# Patient Record
Sex: FEMALE | Race: OTHER | Employment: OTHER | URBAN - METROPOLITAN AREA
[De-identification: names, ages, dates, MRNs, and addresses within clinical notes are randomized per-mention and may not be internally consistent; named-entity substitution may affect disease eponyms.]

---

## 2017-08-01 ENCOUNTER — APPOINTMENT (OUTPATIENT)
Dept: CT IMAGING | Age: 82
DRG: 065 | End: 2017-08-01
Attending: PHYSICIAN ASSISTANT
Payer: SUBSIDIZED

## 2017-08-01 ENCOUNTER — APPOINTMENT (OUTPATIENT)
Dept: GENERAL RADIOLOGY | Age: 82
DRG: 065 | End: 2017-08-01
Attending: PHYSICIAN ASSISTANT
Payer: SUBSIDIZED

## 2017-08-01 ENCOUNTER — APPOINTMENT (OUTPATIENT)
Dept: MRI IMAGING | Age: 82
DRG: 065 | End: 2017-08-01
Attending: INTERNAL MEDICINE
Payer: SUBSIDIZED

## 2017-08-01 ENCOUNTER — HOSPITAL ENCOUNTER (INPATIENT)
Age: 82
LOS: 2 days | Discharge: HOME HEALTH | DRG: 065 | End: 2017-08-03
Attending: EMERGENCY MEDICINE | Admitting: INTERNAL MEDICINE
Payer: SUBSIDIZED

## 2017-08-01 DIAGNOSIS — I63.9 OCCIPITAL STROKE (HCC): Primary | ICD-10-CM

## 2017-08-01 PROBLEM — R53.1 RIGHT SIDED WEAKNESS: Status: ACTIVE | Noted: 2017-08-01

## 2017-08-01 PROBLEM — E11.9 TYPE 2 DIABETES MELLITUS WITHOUT COMPLICATION (HCC): Status: ACTIVE | Noted: 2017-08-01

## 2017-08-01 PROBLEM — R51.9 GENERALIZED HEADACHES: Status: ACTIVE | Noted: 2017-08-01

## 2017-08-01 PROBLEM — I10 HTN (HYPERTENSION), BENIGN: Status: ACTIVE | Noted: 2017-08-01

## 2017-08-01 LAB
ALBUMIN SERPL BCP-MCNC: 3.8 G/DL (ref 3.5–5)
ALBUMIN/GLOB SERPL: 1 {RATIO} (ref 1.1–2.2)
ALP SERPL-CCNC: 72 U/L (ref 45–117)
ALT SERPL-CCNC: 19 U/L (ref 12–78)
ANION GAP BLD CALC-SCNC: 6 MMOL/L (ref 5–15)
AST SERPL W P-5'-P-CCNC: 17 U/L (ref 15–37)
ATRIAL RATE: 96 BPM
BASOPHILS # BLD AUTO: 0 K/UL (ref 0–0.1)
BASOPHILS # BLD: 0 % (ref 0–1)
BILIRUB SERPL-MCNC: 0.7 MG/DL (ref 0.2–1)
BUN SERPL-MCNC: 16 MG/DL (ref 6–20)
BUN/CREAT SERPL: 27 (ref 12–20)
CALCIUM SERPL-MCNC: 8.8 MG/DL (ref 8.5–10.1)
CALCULATED P AXIS, ECG09: 59 DEGREES
CALCULATED R AXIS, ECG10: 37 DEGREES
CALCULATED T AXIS, ECG11: 17 DEGREES
CHLORIDE SERPL-SCNC: 105 MMOL/L (ref 97–108)
CO2 SERPL-SCNC: 30 MMOL/L (ref 21–32)
CREAT SERPL-MCNC: 0.6 MG/DL (ref 0.55–1.02)
DIAGNOSIS, 93000: NORMAL
EOSINOPHIL # BLD: 0 K/UL (ref 0–0.4)
EOSINOPHIL NFR BLD: 0 % (ref 0–7)
ERYTHROCYTE [DISTWIDTH] IN BLOOD BY AUTOMATED COUNT: 12.9 % (ref 11.5–14.5)
EST. AVERAGE GLUCOSE BLD GHB EST-MCNC: 146 MG/DL
GLOBULIN SER CALC-MCNC: 3.7 G/DL (ref 2–4)
GLUCOSE BLD STRIP.AUTO-MCNC: 119 MG/DL (ref 65–100)
GLUCOSE SERPL-MCNC: 128 MG/DL (ref 65–100)
HBA1C MFR BLD: 6.7 % (ref 4.2–6.3)
HCT VFR BLD AUTO: 43.1 % (ref 35–47)
HGB BLD-MCNC: 13.9 G/DL (ref 11.5–16)
LYMPHOCYTES # BLD AUTO: 21 % (ref 12–49)
LYMPHOCYTES # BLD: 1.6 K/UL (ref 0.8–3.5)
MAGNESIUM SERPL-MCNC: 2.2 MG/DL (ref 1.6–2.4)
MCH RBC QN AUTO: 29.5 PG (ref 26–34)
MCHC RBC AUTO-ENTMCNC: 32.3 G/DL (ref 30–36.5)
MCV RBC AUTO: 91.5 FL (ref 80–99)
MONOCYTES # BLD: 0.5 K/UL (ref 0–1)
MONOCYTES NFR BLD AUTO: 6 % (ref 5–13)
NEUTS SEG # BLD: 5.5 K/UL (ref 1.8–8)
NEUTS SEG NFR BLD AUTO: 73 % (ref 32–75)
P-R INTERVAL, ECG05: 170 MS
PLATELET # BLD AUTO: 247 K/UL (ref 150–400)
POTASSIUM SERPL-SCNC: 3.9 MMOL/L (ref 3.5–5.1)
PROT SERPL-MCNC: 7.5 G/DL (ref 6.4–8.2)
Q-T INTERVAL, ECG07: 356 MS
QRS DURATION, ECG06: 94 MS
QTC CALCULATION (BEZET), ECG08: 449 MS
RBC # BLD AUTO: 4.71 M/UL (ref 3.8–5.2)
SERVICE CMNT-IMP: ABNORMAL
SODIUM SERPL-SCNC: 141 MMOL/L (ref 136–145)
T3FREE SERPL-MCNC: 2.7 PG/ML (ref 2.2–4)
T4 FREE SERPL-MCNC: 1 NG/DL (ref 0.8–1.5)
TROPONIN I SERPL-MCNC: <0.04 NG/ML
TSH SERPL DL<=0.05 MIU/L-ACNC: 1.06 UIU/ML (ref 0.36–3.74)
VENTRICULAR RATE, ECG03: 96 BPM
WBC # BLD AUTO: 7.6 K/UL (ref 3.6–11)

## 2017-08-01 PROCEDURE — 96361 HYDRATE IV INFUSION ADD-ON: CPT

## 2017-08-01 PROCEDURE — 99285 EMERGENCY DEPT VISIT HI MDM: CPT

## 2017-08-01 PROCEDURE — 74011250636 HC RX REV CODE- 250/636: Performed by: INTERNAL MEDICINE

## 2017-08-01 PROCEDURE — 84484 ASSAY OF TROPONIN QUANT: CPT | Performed by: PHYSICIAN ASSISTANT

## 2017-08-01 PROCEDURE — 82962 GLUCOSE BLOOD TEST: CPT

## 2017-08-01 PROCEDURE — 85025 COMPLETE CBC W/AUTO DIFF WBC: CPT | Performed by: PHYSICIAN ASSISTANT

## 2017-08-01 PROCEDURE — 36415 COLL VENOUS BLD VENIPUNCTURE: CPT | Performed by: PHYSICIAN ASSISTANT

## 2017-08-01 PROCEDURE — 74011250637 HC RX REV CODE- 250/637: Performed by: INTERNAL MEDICINE

## 2017-08-01 PROCEDURE — 70450 CT HEAD/BRAIN W/O DYE: CPT

## 2017-08-01 PROCEDURE — 65660000000 HC RM CCU STEPDOWN

## 2017-08-01 PROCEDURE — 80053 COMPREHEN METABOLIC PANEL: CPT | Performed by: PHYSICIAN ASSISTANT

## 2017-08-01 PROCEDURE — 84481 FREE ASSAY (FT-3): CPT | Performed by: INTERNAL MEDICINE

## 2017-08-01 PROCEDURE — 93005 ELECTROCARDIOGRAM TRACING: CPT

## 2017-08-01 PROCEDURE — 81001 URINALYSIS AUTO W/SCOPE: CPT | Performed by: PHYSICIAN ASSISTANT

## 2017-08-01 PROCEDURE — 74011250636 HC RX REV CODE- 250/636: Performed by: PHYSICIAN ASSISTANT

## 2017-08-01 PROCEDURE — 96374 THER/PROPH/DIAG INJ IV PUSH: CPT

## 2017-08-01 PROCEDURE — 93880 EXTRACRANIAL BILAT STUDY: CPT

## 2017-08-01 PROCEDURE — 71020 XR CHEST PA LAT: CPT

## 2017-08-01 PROCEDURE — 83735 ASSAY OF MAGNESIUM: CPT | Performed by: PHYSICIAN ASSISTANT

## 2017-08-01 PROCEDURE — 70551 MRI BRAIN STEM W/O DYE: CPT

## 2017-08-01 PROCEDURE — 84439 ASSAY OF FREE THYROXINE: CPT | Performed by: INTERNAL MEDICINE

## 2017-08-01 PROCEDURE — 83036 HEMOGLOBIN GLYCOSYLATED A1C: CPT | Performed by: INTERNAL MEDICINE

## 2017-08-01 PROCEDURE — 84443 ASSAY THYROID STIM HORMONE: CPT | Performed by: INTERNAL MEDICINE

## 2017-08-01 RX ORDER — ENOXAPARIN SODIUM 100 MG/ML
40 INJECTION SUBCUTANEOUS EVERY 24 HOURS
Status: DISCONTINUED | OUTPATIENT
Start: 2017-08-01 | End: 2017-08-03 | Stop reason: HOSPADM

## 2017-08-01 RX ORDER — ATORVASTATIN CALCIUM 10 MG/1
40 TABLET, FILM COATED ORAL
Status: DISCONTINUED | OUTPATIENT
Start: 2017-08-01 | End: 2017-08-02

## 2017-08-01 RX ORDER — CANDESARTAN 32 MG/1
32 TABLET ORAL DAILY
COMMUNITY
End: 2017-10-05 | Stop reason: SDUPTHER

## 2017-08-01 RX ORDER — AMOXICILLIN 250 MG
2 CAPSULE ORAL
Status: DISCONTINUED | OUTPATIENT
Start: 2017-08-01 | End: 2017-08-03 | Stop reason: HOSPADM

## 2017-08-01 RX ORDER — ACETAMINOPHEN 325 MG/1
650 TABLET ORAL
Status: DISCONTINUED | OUTPATIENT
Start: 2017-08-01 | End: 2017-08-03 | Stop reason: HOSPADM

## 2017-08-01 RX ORDER — SODIUM CHLORIDE 0.9 % (FLUSH) 0.9 %
5-10 SYRINGE (ML) INJECTION AS NEEDED
Status: DISCONTINUED | OUTPATIENT
Start: 2017-08-01 | End: 2017-08-03 | Stop reason: HOSPADM

## 2017-08-01 RX ORDER — ONDANSETRON 2 MG/ML
2 INJECTION INTRAMUSCULAR; INTRAVENOUS
Status: DISCONTINUED | OUTPATIENT
Start: 2017-08-01 | End: 2017-08-03 | Stop reason: HOSPADM

## 2017-08-01 RX ORDER — DEXTROSE 50 % IN WATER (D50W) INTRAVENOUS SYRINGE
12.5-25 AS NEEDED
Status: DISCONTINUED | OUTPATIENT
Start: 2017-08-01 | End: 2017-08-03 | Stop reason: HOSPADM

## 2017-08-01 RX ORDER — SODIUM CHLORIDE 0.9 % (FLUSH) 0.9 %
5-10 SYRINGE (ML) INJECTION EVERY 8 HOURS
Status: DISCONTINUED | OUTPATIENT
Start: 2017-08-01 | End: 2017-08-03 | Stop reason: HOSPADM

## 2017-08-01 RX ORDER — ACETAMINOPHEN 650 MG/1
650 SUPPOSITORY RECTAL
Status: DISCONTINUED | OUTPATIENT
Start: 2017-08-01 | End: 2017-08-03 | Stop reason: HOSPADM

## 2017-08-01 RX ORDER — ONDANSETRON 2 MG/ML
4 INJECTION INTRAMUSCULAR; INTRAVENOUS
Status: COMPLETED | OUTPATIENT
Start: 2017-08-01 | End: 2017-08-01

## 2017-08-01 RX ORDER — MAGNESIUM SULFATE 100 %
4 CRYSTALS MISCELLANEOUS AS NEEDED
Status: DISCONTINUED | OUTPATIENT
Start: 2017-08-01 | End: 2017-08-03 | Stop reason: HOSPADM

## 2017-08-01 RX ORDER — FAMOTIDINE 20 MG/1
20 TABLET, FILM COATED ORAL EVERY 12 HOURS
Status: DISCONTINUED | OUTPATIENT
Start: 2017-08-01 | End: 2017-08-03 | Stop reason: HOSPADM

## 2017-08-01 RX ORDER — GUAIFENESIN 100 MG/5ML
81 LIQUID (ML) ORAL DAILY
Status: DISCONTINUED | OUTPATIENT
Start: 2017-08-02 | End: 2017-08-02

## 2017-08-01 RX ORDER — IBUPROFEN 200 MG
200 TABLET ORAL
COMMUNITY
End: 2017-08-03

## 2017-08-01 RX ORDER — SODIUM CHLORIDE 9 MG/ML
75 INJECTION, SOLUTION INTRAVENOUS CONTINUOUS
Status: DISPENSED | OUTPATIENT
Start: 2017-08-01 | End: 2017-08-02

## 2017-08-01 RX ORDER — INSULIN LISPRO 100 [IU]/ML
INJECTION, SOLUTION INTRAVENOUS; SUBCUTANEOUS
Status: DISCONTINUED | OUTPATIENT
Start: 2017-08-01 | End: 2017-08-03 | Stop reason: HOSPADM

## 2017-08-01 RX ORDER — LABETALOL HYDROCHLORIDE 5 MG/ML
20 INJECTION, SOLUTION INTRAVENOUS
Status: DISCONTINUED | OUTPATIENT
Start: 2017-08-01 | End: 2017-08-03 | Stop reason: HOSPADM

## 2017-08-01 RX ADMIN — FAMOTIDINE 20 MG: 20 TABLET, FILM COATED ORAL at 20:52

## 2017-08-01 RX ADMIN — ENOXAPARIN SODIUM 40 MG: 40 INJECTION SUBCUTANEOUS at 20:53

## 2017-08-01 RX ADMIN — SODIUM CHLORIDE 75 ML/HR: 900 INJECTION, SOLUTION INTRAVENOUS at 20:56

## 2017-08-01 RX ADMIN — SODIUM CHLORIDE 500 ML: 900 INJECTION, SOLUTION INTRAVENOUS at 13:35

## 2017-08-01 RX ADMIN — ONDANSETRON 4 MG: 2 INJECTION INTRAMUSCULAR; INTRAVENOUS at 13:36

## 2017-08-01 RX ADMIN — ATORVASTATIN CALCIUM 40 MG: 10 TABLET, FILM COATED ORAL at 22:01

## 2017-08-01 RX ADMIN — DOCUSATE SODIUM -SENNOSIDES 2 TABLET: 50; 8.6 TABLET, COATED ORAL at 22:01

## 2017-08-01 RX ADMIN — ACETAMINOPHEN 650 MG: 325 TABLET ORAL at 20:52

## 2017-08-01 NOTE — PROGRESS NOTES
Bedside and Verbal shift change report given to [address] [place] [address] (oncoming nurse) by Ying Herrera RN (offgoing nurse). Report included the following information SBAR, Kardex, Procedure Summary, Intake/Output, MAR, Accordion, Recent Results and Med Rec Status.

## 2017-08-01 NOTE — ED NOTES
Dr Pool Hillburn at bedside evaulating pt and talking with grandson. Slime Yates is interpreting for pt.

## 2017-08-01 NOTE — ED NOTES
Pt's grandson at bedside and is interpreting for pt. Grandson felt that it would be better for the pt instead instead of using the [employer] phones. NIH scale done with grandson interpreting.

## 2017-08-01 NOTE — PROGRESS NOTES
Stroke Education provided to patient and Grandson and the following topics were discussed    1. Patients personal risk factors for stroke are hypertension and diabetes mellitus    2. Warning signs of Stroke:        * Sudden numbness or weakness of the face, arm or leg, especially on one side of          The body            * Sudden confusion, trouble speaking or understanding        * Sudden trouble seeing in one or both eyes        * Sudden trouble walking, dizziness, loss of balance or coordination        * Sudden severe headache with no known cause      3. Importance of activation Emergency Medical Services ( 9-1-1 ) immediately if experience any warning signs of stroke. 4. Be sure and schedule a follow-up appointment with your primary care doctor or any specialists as instructed. 5. You must take medicine every day to treat your risk factors for stroke. Be sure to take your medicines exactly as your doctor tells you: no more, no less. Know what your medicines are for , what they do. Anti-thrombotics /anticoagulants can help prevent strokes. You are taking the following medicine(s)  ASA     6. Smoking and second-hand smoke greatly increase your risk of stroke, cardiovascular disease and death. Smoking history never    7. Information provided was [facility] Stroke Education Binder, Stroke Handouts or Verbal Education    8. Documentation of teaching completed in Patient Education Activity and on Care Plan with teaching response noted?   yes

## 2017-08-01 NOTE — PROGRESS NOTES
[facility]: MED RECONCILIATION    Comments/Recommendations:     Pt denies taking daily aspirin of any strength    Information obtained from: Medication packs, patient, family member      Allergies: Review of patient's allergies indicates no known allergies. Prior to Admission Medications:     Medication Documentation Review Audit       Reviewed by Saúl Sharp (Pharmacist) on 08/01/17 at 1552         Medication Sig Documenting Provider Last Dose Status Taking? candesartan (ATACAND) 32 mg tablet Take 32 mg by mouth daily. Historical Provider 8/1/2017 AM Active Yes    glimepiride (AMARYL) 2 mg tablet Take 2 mg by mouth every morning. Skye Perry MD 8/1/2017 AM Active Yes    ibuprofen (MOTRIN) 200 mg tablet Take 200 mg by mouth daily as needed for Pain. Historical Provider 7/31/2017 Active Yes                    Thank you,  Barry Lorenzo, Pharm. D.

## 2017-08-01 NOTE — IP AVS SNAPSHOT
[address] 
[phone] Patient: Laureano Caba MRN: [id number] :11/3/1926 Current Discharge Medication List  
  
START taking these medications Dose & Instructions Dispensing Information Comments Morning Noon Evening Bedtime  
 atorvastatin 10 mg tablet Commonly known as:  LIPITOR Your last dose was: Your next dose is:    
   
   
 Dose:  10 mg Take 1 Tab by mouth nightly. Indications: prevention of cerebrovascular accident Quantity:  30 Tab Refills:  0  
     
   
   
   
  
 clopidogrel 75 mg Tab Commonly known as:  PLAVIX Your last dose was: Your next dose is:    
   
   
 Dose:  75 mg Take 1 Tab by mouth daily. Quantity:  30 Tab Refills:  0 CONTINUE these medications which have NOT CHANGED Dose & Instructions Dispensing Information Comments Morning Noon Evening Bedtime  
 candesartan 32 mg tablet Commonly known as:  ATACAND Your last dose was: Your next dose is:    
   
   
 Dose:  32 mg Take 32 mg by mouth daily. Refills:  0  
     
   
   
   
  
 glimepiride 2 mg tablet Commonly known as:  AMARYL Your last dose was: Your next dose is:    
   
   
 Dose:  2 mg Take 2 mg by mouth every morning. Refills:  0 STOP taking these medications   
 ibuprofen 200 mg tablet Commonly known as:  MOTRIN Where to Get Your Medications Information on where to get these meds will be given to you by the nurse or doctor. ! Ask your nurse or doctor about these medications  
  atorvastatin 10 mg tablet  
 clopidogrel 75 mg Tab

## 2017-08-01 NOTE — IP AVS SNAPSHOT
[address] [place] [address] 
[phone] Patient: Billy Harrington MRN: [id number] :11/3/1926 You are allergic to the following No active allergies Recent Documentation Height Weight BMI OB Status Smoking Status 1.626 m 54.4 kg 20.6 kg/m2 Postmenopausal Never Smoker Emergency Contacts Name Discharge Info Relation Home Work Mobile [address] [facility] CAREGIVER [3] Other Relative [6]   [phone] About your hospitalization You were admitted on:  2017 You last received care in the:  [facility]  MED SURG 2 You were discharged on:  August 3, 2017 Unit phone number:  [phone] Why you were hospitalized Your primary diagnosis was:  Acute Cva (Cerebrovascular Accident) (Hcc) Your diagnoses also included:  Right Sided Weakness, Htn (Hypertension), Benign, Type 2 Diabetes Mellitus Without Complication (Hcc), Generalized Headaches Providers Seen During Your Hospitalizations Provider Role Specialty Primary office phone Redd Jaiden. Kitty Smith MD Attending Provider Emergency Medicine [phone] Tamera Shirley MD Attending Provider Internal Medicine [phone] Lynne Mathew MD Attending Provider Internal Medicine [phone] Your Primary Care Physician (PCP) Primary Care Physician Office Phone Office Fax NONE ** None ** ** None ** Follow-up Information Follow up With Details Comments Contact Info  
 follow up with a primary care doctor in 1-2 weeks Gretel Cheatham NP Go on 2017 Neurology follow-up with nurse practitioner post stroke:  Arrive at [address] [place] [address] [phone] Kamilla Asencio MD On 2017 3:30PM  Bring $50 copay and a photo ID Lakshmi Whitehead 1007 [facility] 
[phone] 201 [facility] [address] 
1st Floor [facility] [address] 
[phone] Your Appointments Friday August 04, 2017  4:00 PM EDT TRANSITIONAL CARE MANAGEMENT with Malachi Baxter MD  
1515 [facility])  
 [address]  
[phone] Friday August 25, 2017 11:00 AM EDT Any with Marylin Lewis NP 1991 [facility] [place] [address] 99 [phone] [phone] Current Discharge Medication List  
  
START taking these medications Dose & Instructions Dispensing Information Comments Morning Noon Evening Bedtime  
 aspirin 81 mg chewable tablet Your last dose was: Your next dose is:    
   
   
 Dose:  81 mg Take 1 Tab by mouth daily. Quantity:  30 Tab Refills:  0  
     
   
   
   
  
 atorvastatin 10 mg tablet Commonly known as:  LIPITOR Your last dose was: Your next dose is:    
   
   
 Dose:  10 mg Take 1 Tab by mouth nightly. Indications: prevention of cerebrovascular accident Quantity:  30 Tab Refills:  0 CONTINUE these medications which have NOT CHANGED Dose & Instructions Dispensing Information Comments Morning Noon Evening Bedtime  
 candesartan 32 mg tablet Commonly known as:  ATACAND Your last dose was: Your next dose is:    
   
   
 Dose:  32 mg Take 32 mg by mouth daily. Refills:  0  
     
   
   
   
  
 glimepiride 2 mg tablet Commonly known as:  AMARYL Your last dose was: Your next dose is:    
   
   
 Dose:  2 mg Take 2 mg by mouth every morning. Refills:  0 STOP taking these medications   
 ibuprofen 200 mg tablet Commonly known as:  MOTRIN Where to Get Your Medications Information on where to get these meds will be given to you by the nurse or doctor. ! Ask your nurse or doctor about these medications  
  aspirin 81 mg chewable tablet  
 atorvastatin 10 mg tablet Discharge Instructions HOSPITALIST DISCHARGE INSTRUCTIONS 
NAME: Luis A Valenzuela :  11/3/1926 MRN:  [id number] Date/Time:  2017 9:07 AM 
 
ADMIT DATE: 2017 DISCHARGE DATE: 8/3/2017 PRINCIPAL DISCHARGE DIAGNOSES: 
stroke MEDICATIONS: 
· It is important that you take the medication exactly as they are prescribed. Note the changes and additions to your medications. Be sure you understand these changes before you are discharged today. · Keep your medication in the bottles provided by the pharmacist and keep a list of the medication names, dosages, and times to be taken in your wallet. · Do not take other medications without consulting your doctor. Pain Management: per above medications What to do at [facility] Recommended diet:  Cardiac Diet and Diabetic Diet Recommended activity: Activity as tolerated If you experience any of the following symptoms then please call your primary care physician or return to the emergency room if you cannot get hold of your doctor: 
Severe headache, dizziness, facial droop, slurred speech, weakness, numbness, or other severe concerning symptoms that brought you to the hospital in the first place Follow Up: Follow-up Information Follow up With Details Comments Contact Info  
 follow up with a primary care doctor in 1-2 weeks Shahrzad Feng NP Go on 2017 Neurology follow-up with nurse practitioner post stroke:  Arrive at [address] [place] [phone] Information obtained by : 
I understand that if any problems occur once I am at home I am to contact my physician. I understand and acknowledge receipt of the instructions indicated above. Physician's or R.N.'s Signature                                                                  Date/Time Patient or Representative Signature                                                          Date/Time Discharge Orders None [facility] Announcement We are excited to announce that we are making your provider's discharge notes available to you in [email or web address]. You will see these notes when they are completed and signed by the physician that discharged you from your recent hospital stay. If you have any questions or concerns about any information you see in [email or web address], please call the Health Information Department where you were seen or reach out to your Primary Care Provider for more information about your plan of care. Introducing \[facility] [place] [facility]\"" & HEALTH SERVICES! [facility] introduce portal paciente [facility] . Ahora se puede acceder a partes de deshpande expediente médico, enviar por correo electrónico la oficina de deshpande médico y solicitar renovaciones de medicamentos en línea. En deshpande navegador de Internet , Uzair Yi a [email or web address] Moris clic en el usuario por Paige Solano? [facility] clic aquí en la sesión Letty Fournier. Verá la página de registro [place]. Ingrese deshpande código de [employer] anais y jaswinder aparece a continuación. Usted no tendrá que [employer] código después de elver completado el proceso de registro . Si usted no se inscribe antes de la fecha de caducidad , debe solicitar un nuevo código. · [facility] Código de acceso : [id number] Expires: 10/31/2017  1:42 PM 
 
Ingresa los últimos cuatro dígitos de deshpande Número de Seguro Social ( xxxx ) y fecha de nacimiento ( dd / mm / aaaa ) jaswinder se indica y moris clic en Enviar. Usted será llevado a la siguiente página de registro . Crear un ID MyChart . Esta será deshpande ID de inicio de sesión de MyChart y no puede ser [place] , por lo que pensar en yvonne que es Trula Albaro y fácil de recordar . Crear yvonne contraseña MyChart . Usted puede cambiar deshpande contraseña en cualquier momento . Ingrese deshpande Password Reset de preguntas y [place] . [place] se puede utilizar en un momento posterior si usted olvida deshpande contraseña. Introduzca deshpande dirección de correo electrónico . Mirta Whiting recibirá yvonne notificación por correo electrónico cuando la nueva información está disponible en MyChart . Lucía Mews clic en Registrarse. Kait Leak ash y descargar porciones de deshpande expediente médico. 
George clic en el enlace de descarga del menú Resumen para descargar yvonne copia portátil de deshpande información médica . Si tiene [employer] , por favor visite la sección de preguntas frecuentes del sitio web MyChart . Recuerde, MyChart NO es que se utilizará para las necesidades urgentes. Para emergencias médicas , llame al 911 . Ahora disponible en deshpande iPhone y Android ! General Information Please provide this summary of care documentation to your next provider. Patient Signature:  ____________________________________________________________ Date:  ____________________________________________________________  
  
Syed Marshall Provider Signature:  ____________________________________________________________ Date:  ____________________________________________________________  
  
  
   
  
[address] 
[phone] Patient: Irena Campuzano MRN: [id number] :11/3/1926 Tiene alergias a lo siguiente No tiene alergias Documentación recientes Height Weight BMI (IMC) Estado [facility]rico [facility] 1.626 m 54.4 kg 20.6 kg/m2 Postmenopausal Never Smoker Emergency Contacts Name Discharge Info Relation Home Work Mobile 44 [facility] CAREGIVER [3] Other Relative [6]   [phone] Sobre marie hospitalización Le admitieron el:  August 1, 2017 Marie tratamiento más reciente fue el:  [facility]  MED SURG 2 Le dieron de larry el:  August 3, 2017 Número de teléfono de la unidad:  [phone] Por qué le ingresaron Marie diagnosis primaria es:  Acute Cva (Cerebrovascular Accident) (Hcc) Marie diagnosis también incluye:  Right Sided Weakness, Htn (Hypertension), Benign, Type 2 Diabetes Mellitus Without Complication (Hcc), Generalized Headaches Proveedores de verse ervin omayra hospitalizaciones Personal Médico Rol Especialidad Teléfono principal de la ofekaterinaa Deon Anish. Abe Banda MD Attending Provider Emergency Medicine [phone] Camryn Nguyen MD Attending Provider Internal Medicine [phone] Gela Kauffman MD Attending Provider Internal Medicine [phone] Marie médico de atención primaria (PCP ) Primary Care Physician Office Phone Office Fax NONE ** None ** ** None ** Follow-up Information Follow up With Details Comments Contact Info  
 follow up with a primary care doctor in 1-2 weeks Vazquez Martinez NP Go on 8/25/2017 Neurology follow-up with nurse practitioner post stroke:  Arrive at [address] [place] [phone] 
[phone] Ivan Elias MD On 8/4/2017 3:30PM  Bring $50 copay and a photo ID Lakshmi Bronwyn Koo 906 1007 [facility] 
[phone] 201 [facility] [address] [place] 
1st Floor [place] [address] 
[phone] Omayra citas Friday August 04, 2017  4:00 PM EDT TRANSITIONAL CARE MANAGEMENT with Ivan Elias MD  
[address] [place] [address] 3 1007 [facility]  
[phone] Friday August 25, 2017 11:00 AM EDT Any with Sima Raygoza NP [address] [facility] [address] ([address] [facility]) [place] [address] [place] [address] [place] [phone] Aprobación de la gestión actual lista de medicamentos EMPIECE a anushka Suda Dosis e instrucciones Información de dispensación Comentarios Morning Noon Evening Bedtime  
 aspirin 81 mg chewable tablet Your last dose was: Your next dose is:    
   
   
 Dosis:  81 mg Take 1 Tab by mouth daily. cantidad:  30 Tab  
recargas:  0  
     
   
   
   
  
 atorvastatin 10 mg tablet También conocido jaswinder:  LIPITOR Your last dose was: Your next dose is:    
   
   
 Dosis:  10 mg Take 1 Tab by mouth nightly. Indications: prevention of cerebrovascular accident  
 cantidad:  30 Tab  
recargas:  0 CONTINUAR estos medicamentos que no [place] Dosis e instrucciones Información de dispensación Comentarios Morning Noon Evening Bedtime  
 candesartan 32 mg tablet También conocido jaswinder:  ATACAND Your last dose was: Your next dose is:    
   
   
 Dosis:  32 mg Take 32 mg by mouth daily. recargas:  0  
     
   
   
   
  
 glimepiride 2 mg tablet También conocido jaswinder:  AMARYL Your last dose was: Your next dose is:    
   
   
 Dosis:  2 mg Take 2 mg by mouth every morning. recargas:  0 DEJE de anushka estos medicamentos   
 ibuprofen 200 mg tablet También conocido jaswinder:  ROXANA Dónde puede recoger bill medicamentos Información sobre dónde obtener estos medicamentos se le dará a usted por la enfermera o el médico.   
 ! Pregunte a deshpande médico o enfermero/a sobre estos medicamentos  
  aspirin 81 mg chewable tablet  
 atorvastatin 10 mg tablet Discharge Instructions HOSPITALIST DISCHARGE INSTRUCTIONS 
NAME: Billy Harrington :  11/3/1926 MRN:  [id number] Date/Time:  2017 9:07 AM 
 
ADMIT DATE: 2017 DISCHARGE DATE: 8/3/2017 PRINCIPAL DISCHARGE DIAGNOSES: 
stroke MEDICATIONS: 
· It is important that you take the medication exactly as they are prescribed. Note the changes and additions to your medications. Be sure you understand these changes before you are discharged today. · Keep your medication in the bottles provided by the pharmacist and keep a list of the medication names, dosages, and times to be taken in your wallet. · Do not take other medications without consulting your doctor. Pain Management: per above medications What to do at [facility] Recommended diet:  Cardiac Diet and Diabetic Diet Recommended activity: Activity as tolerated If you experience any of the following symptoms then please call your primary care physician or return to the emergency room if you cannot get hold of your doctor: 
Severe headache, dizziness, facial droop, slurred speech, weakness, numbness, or other severe concerning symptoms that brought you to the hospital in the first place Follow Up: Follow-up Information Follow up With Details Comments Contact Info  
 follow up with a primary care doctor in 1-2 weeks Gretel Cheatham NP Go on 2017 Neurology follow-up with nurse practitioner post stroke:  Arrive at [address] [place] [phone] Information obtained by : 
I understand that if any problems occur once I am at home I am to contact my physician. I understand and acknowledge receipt of the instructions indicated above. Physician's or R.N.'s Signature                                                                  Date/Time Patient or Representative Signature                                                          Date/Time Discharge Orders [facility] Announcement We are excited to announce that we are making your provider's discharge notes available to you in Helveta. You will see these notes when they are completed and signed by the physician that discharged you from your recent hospital stay. If you have any questions or concerns about any information you see in Helveta, please call the Health Information Department where you were seen or reach out to your Primary Care Provider for more information about your plan of care. Introducing [facility] HEALTH SERVICES! Burt Fletcher introduce portal paciente Helveta . Ahora se puede acceder a partes de deshpande expediente médico, enviar por correo electrónico la oficina de deshpande médico y solicitar renovaciones de medicamentos en línea. En deshpande navegador de Internet , Uzair Yi a [email or web address]. The Clearing/Powered Outcomest Moris clic en el usuario por Paige Solano? [employer] [place] clic aquí en la sesión Letty Fournier. Verá la página de registro [place]. Ingrese deshpande código de [employer] anais y jaswinder aparece a continuación. Usted no tendrá que [employer] código después de elver completado el proceso de registro . Si usted no se inscribe antes de la fecha de caducidad , debe solicitar un nuevo código. · [facility] Código de acceso : [id number] Expires: 10/31/2017  1:42 PM 
 
Ingresa los últimos cuatro dígitos de deshpande Número de Seguro Social ( xxxx ) y fecha de nacimiento ( dd / mm / aaaa ) jaswinder se indica y moris clic en Enviar. Usted será llevado a la siguiente página de registro . Crear un ID MyChart . Esta será deshpande ID de inicio de sesión de MyChart y no puede ser [place] , por lo que pensar en yvonne que es Dicky Carrasquillo y fácil de recordar . Crear yvonne contraseña MyChart . Usted puede cambiar deshpande contraseña en cualquier momento . Ingrese deshpande Password Reset de preguntas y [place] . [place] se puede utilizar en un momento posterior si usted olvida deshpande contraseña. Introduzca deshpande dirección de correo electrónico . Nadya Gary recibirá yvonne notificación por correo electrónico cuando la nueva información está disponible en MyChart . Opp Bue clic en Registrarse. Beckie Fought ash y descargar porciones de deshpande expediente médico. 
George clic en el enlace de descarga del menú Resumen para descargar yvonne copia portátil de deshpande información médica . Si tiene [employer] , por favor visite la sección de preguntas frecuentes del sitio web MyChart . Recuerde, MyChart NO es que se utilizará para las necesidades urgentes. Para emergencias médicas , llame al 911 . Ahora disponible en deshpande iPhone y Android ! General Information Please provide this summary of care documentation to your next provider. Patient Signature:  ____________________________________________________________ Date:  ____________________________________________________________  
  
Sabrina Homer Provider Signature:  ____________________________________________________________ Date:  ____________________________________________________________

## 2017-08-01 NOTE — PROGRESS NOTES
Primary Nurse Rimma Lucio, MAURA and Hal Luo, RN performed a dual skin assessment on this patient No impairment noted  Mayco score is 20

## 2017-08-01 NOTE — ED TRIAGE NOTES
Pt. C/o pain to right shoulder that goes down her arm for the past 2 days with slight numbness to right hand for 2 days, and notes slight headache. Pt. Denies any CP/SOB. No injury noted. Grandson with pt. No facial droop, no drift, no weakness,  equal.  Speech clear.

## 2017-08-01 NOTE — ED NOTES
Betina now states that the [employer] phone should be used for interpretation to do the MRI checklist.

## 2017-08-01 NOTE — H&P
[place] [address]  [phone]    Admission History and Physical      NAME:              Lakeisha Osborne   :   11/3/1926   MRN:  [id number]     PCP:  None     Date:     2017     Chief  Complaint: Right sided pain and weakness, headache    History Of Presenting Illness:       Ms. Sea Mariano is a 80 y.o. female who is being admitted for a suspected acute CVA. Ms. Sea Mariano is  speaking and does not understand much [place]. I have discussed with her grandson for collaborative Hx, She was brought to our Emergency Department today complaining of a persistent global headache associated with a right sided pain and weakness, decreased appetite and fatigue. She has been in the country for 2 months from [place]. No reported speech problems, eating difficulties of falls. A head CT scan done showed a geographic hypodensity in the left occipital lobe suspicious of an acute or subacute infarct. She will be admitted to hospital for further management. No Known Allergies    Prior to Admission medications    Medication Sig Start Date End Date Taking? Authorizing Provider   candesartan (ATACAND) 32 mg tablet Take 32 mg by mouth daily. Yes Historical Provider   ibuprofen (MOTRIN) 200 mg tablet Take 200 mg by mouth daily as needed for Pain. Yes Historical Provider   glimepiride (AMARYL) 2 mg tablet Take 2 mg by mouth every morning.    Yes Skye Perry MD       Past Medical History:   Diagnosis Date    Diabetes ([facility] [address]     Hypertension         Past Surgical History:   Procedure Laterality Date    HX OTHER SURGICAL      tonsils       Social History   Substance Use Topics    Smoking status: Never Smoker    Smokeless tobacco: Not on file    Alcohol use No      Family history: neg for CVA, HTN, DM      Review of Systems: unobtainable from patient    Examination:    Constitutional:    Visit Vitals    /86    Pulse 92    Temp 99.1 °F (37.3 °C)    Resp 19    Ht 5' 4\" (1.626 m)    Wt 54.4 kg (120 lb)    SpO2 96%    BMI 20.6 kg/m2       General:  Weak and ill looking patient in no acute distress  Eyes: Pink conjunctivae, PERRLA with no discharge. Normal eye movements  Ear, Nose, Mouth & Throat: No ottorrhea, rhinorrhea, non tender sinuses, dry mucous membranes  Respiratory:  No accessory muscle use, clear breath sounds without crackles or wheezes  Cardiovascular:  No JVD or murmurs, regular and normal S1, S2 without thrills, bruits or peripheral edema. GI & :  Soft abdomen, non-tender, non-distended, normoactive bowel sounds   Heme:  No cervical or axillary adenopathy. Musculoskeletal:  No cyanosis, clubbing, atrophy or deformities  Skin:  No rashes, bruising or ulcers   Neurological: Awake and alert, No obvious facial droop. Has a right sided weakness. Poor passive response to commands. Psychiatric:  Has little insight to illness   ________________________________________________________________________    Data Review:    Labs:    Recent Labs      08/01/17   1328   WBC  7.6   HGB  13.9   HCT  43.1   PLT  247     Recent Labs      08/01/17   1328   NA  141   K  3.9   CL  105   CO2  30   GLU  128*   BUN  16   CREA  0.60   CA  8.8   MG  2.2   ALB  3.8   SGOT  17   ALT  19     No components found for: GLPOC  No results for input(s): PH, PCO2, PO2, HCO3, FIO2 in the last 72 hours. No results for input(s): INR in the last 72 hours. No lab exists for component: INREXT    Radiological Studies:      CT scan head - Geographic hypodensity in the left occipital lobe may represent acute or subacute infarct    Other Medical tests:    Personally reviewed 12 lead EKG: Normal rate, rhythm, axis and intervals. and No acute changes suggestive of ischemia    I have also reviewed available old medical records.      Assessment & Impression:     Ms. Eliz Conde is a 80 y.o. female being evaluated for:     Active Problems:    Right sided weakness (8/1/2017)      HTN (hypertension), benign (8/1/2017)      Type 2 diabetes mellitus without complication ([facility] [address] (0/5/6806)      Generalized headaches (8/1/2017)         Plan of management:    Right sided weakness (8/1/2017)/ Generalized headaches (8/1/2017): concerning for an acute CVA. Admit to hospital. Initiate a CVA work up with a brain MRI, doppler carotids and an Echo. Consult neurology, PT, OT and speech. Start Asprin. HTN (hypertension), benign (8/1/2017): BP stable for now. Will hold home medications and follow closely    Type 2 diabetes mellitus without complication ([facility] [address] (2/9/5243): check an A1c. Monitor blood glucose. SSI per protocol.  Diabetic diet once able to feed orally    Code Status:  Full    Surrogate decision maker: Family    Risk of deterioration: high      Total time spent for the care of the patient: [address] discussed with: Family, Nursing Staff and ED physician    Discussed:  Code Status, Care Plan and D/C Planning    Prophylaxis:  Lovenox    Probable Disposition:   PT, OT, RN           ___________________________________________________    Attending Physician: Stuart Camacho MD

## 2017-08-01 NOTE — ED NOTES
MRI checklist completed with pt using language line, interpretor [id number]. Family remained at bedside during discussion.

## 2017-08-01 NOTE — ROUTINE PROCESS
TRANSFER - OUT REPORT:    Verbal report given to Lizeth Castro RN(name) on Leo Sun  being transferred to 5th floor remote telemetry(unit) for routine progression of care       Report consisted of patients Situation, Background, Assessment and   Recommendations(SBAR). Information from the following report(s) SBAR, ED Summary, Recent Results and Cardiac Rhythm sinus was reviewed with the receiving nurse. Lines:   Peripheral IV 07/22/17 Right; Inner Antecubital (Active)   Site Assessment Clean, dry, & intact 8/1/2017  1:34 PM   Phlebitis Assessment 0 8/1/2017  1:34 PM   Infiltration Assessment 0 8/1/2017  1:34 PM   Dressing Status Clean, dry, & intact 8/1/2017  1:34 PM   Dressing Type Transparent 8/1/2017  1:34 PM   Hub Color/Line Status Pink;Patent; Flushed 8/1/2017  1:34 PM        Opportunity for questions and clarification was provided.       Patient transported with:   [employer]

## 2017-08-01 NOTE — ED PROVIDER NOTES
HPI Comments: 80 y.o. [place]ian-speaking female with past medical history significant for HTN and DM, who presents with her grandson with chief complaint of diffuse right arm pain x 2 days. Pt reports that her pain in the right arm radiates from the shoulder down to the wrist, and started two days ago without any injury or trauma. She ranks her current level of discomfort as an 8/10 and states the pain is constant. Pt is able to move her right arm, but she has pain when she does so. In addition to the arm pain, she c/o nausea and a decreased appetite today, and she notes that she has a HA. She reports subjective fevers last night, but denies taking her temperature at home. Of note, pt's grandson states that the patient lives in [place], but two months ago she came to the [place] on vacation. She has had no other health concerns while here, has been compliant with all of her medications, and she is scheduled to return home in a few more weeks. Pt specifically denies any vomiting, CP, SOB, diarrhea, rash, or pain elsewhere including the left arm. There are no other acute medical concerns at this time. PMHx is significant for: HTN, DM  PSHx is significant for tonsillectomy  Social History: (-) Tobacco, (-) EtOH, (-) Illicit Drugs   PCP: None in the [facility] (Patient lives in [place])    Note written by Eric Pardo, as dictated by ANA Lanza 12:30 PM     The history is provided by the patient and a relative (grandson). A  was used (Grandson). Past Medical History:   Diagnosis Date    Diabetes ([address]     Hypertension        Past Surgical History:   Procedure Laterality Date    HX OTHER SURGICAL      tonsils         No family history on file. Social History     Social History    Marital status:      Spouse name: N/A    Number of children: N/A    Years of education: N/A     Occupational History    Not on file.      Social History Main Topics    Smoking status: Never Smoker    Smokeless tobacco: Not on file    Alcohol use No    Drug use: Not on file    Sexual activity: Not on file     Other Topics Concern    Not on file     Social History Narrative    No narrative on file         ALLERGIES: Review of patient's allergies indicates no known allergies. Review of Systems   Constitutional: Positive for appetite change (decreased) and fever (subjective). Negative for chills and fatigue. HENT: Negative for congestion, ear pain, postnasal drip, rhinorrhea, sneezing and sore throat. Eyes: Negative for redness and visual disturbance. Respiratory: Negative for cough, shortness of breath and wheezing. Cardiovascular: Negative for chest pain, palpitations and leg swelling. Gastrointestinal: Positive for nausea. Negative for abdominal pain, anal bleeding, constipation, diarrhea and vomiting. Genitourinary: Negative for difficulty urinating, dysuria, frequency and hematuria. Musculoskeletal: Positive for myalgias (right arm). Negative for back pain and neck stiffness. Negative for left arm pain, negative for leg pain   Skin: Negative for rash. Allergic/Immunologic: Negative for immunocompromised state. Neurological: Positive for headaches. Negative for dizziness, syncope, weakness and light-headedness. Hematological: Negative for adenopathy. Patient Vitals for the past 12 hrs:   Temp Pulse Resp BP SpO2   08/01/17 1730 - 93 22 152/79 95 %   08/01/17 1701 - 81 16 - 95 %   08/01/17 1700 - - - 138/61 -   08/01/17 1630 - 87 17 122/60 93 %   08/01/17 1600 - 85 17 136/60 93 %   08/01/17 1531 - 92 19 146/86 96 %   08/01/17 1500 - 80 15 132/61 95 %   08/01/17 1430 - 85 15 141/63 96 %   08/01/17 1412 - 88 13 153/76 98 %   08/01/17 1212 99.1 °F (37.3 °C) 98 16 162/77 96 %              Physical Exam   Constitutional: She is oriented to person, place, and time. She appears well-developed and well-nourished. No distress.    Elderly female HENT:   Head: Normocephalic and atraumatic. Right Ear: External ear normal.   Left Ear: External ear normal.   Nose: Nose normal.   Mouth/Throat: Oropharynx is clear and moist. No oropharyngeal exudate. Neck: Neck supple. No JVD present. No tracheal deviation present. Non-tender to midline and throughout. No swelling or step off. No discoloration or deformity. No lesions. Moves neck full ROM without diff against resistance.  5/5 bilaterally. Cardiovascular: Normal rate, regular rhythm, normal heart sounds and intact distal pulses. Exam reveals no gallop and no friction rub. No murmur heard. Pulmonary/Chest: Effort normal and breath sounds normal. No stridor. No respiratory distress. She has no wheezes. She has no rales. She exhibits no tenderness. Abdominal: Soft. Bowel sounds are normal. She exhibits no distension and no mass. There is no tenderness. There is no rebound and no guarding. Musculoskeletal: Normal range of motion. She exhibits tenderness. She exhibits no edema or deformity. Right arm diffusely TTP without decreased strength, discoloration or lesions. ROM intact. Lymphadenopathy:     She has no cervical adenopathy. Neurological: She is alert and oriented to person, place, and time. No cranial nerve deficit. Coordination normal.   Skin: No rash noted. No erythema. No pallor. Psychiatric: She has a normal mood and affect. Her behavior is normal.   Nursing note and vitals reviewed.        MDM  Number of Diagnoses or Management Options  Occipital stroke [facility]):      Amount and/or Complexity of Data Reviewed  Clinical lab tests: ordered and reviewed  Tests in the radiology section of CPT®: reviewed and ordered  Tests in the medicine section of CPT®: reviewed and ordered  Obtain history from someone other than the patient: yes (grandson)  Review and summarize past medical records: yes  Independent visualization of images, tracings, or specimens: yes    Patient Progress  Patient progress: stable    ED Course       Procedures      PROGRESS NOTE:  12:29 PM   Patient offered translation via phone service. Patient states that she would rather her grandson translate for her while she is in the ED.      1:16 PM  Discussed pt, sx, hx and current findings with Dr Lionel Turner. He is in agreement with plan and will see pt  Radha Turner. RENETTA Berry    ED EKG interpretation: 12:28 PM  Rhythm: normal sinus rhythm; and regular . Rate (approx.): 96; Axis: normal; P wave: normal; QRS interval: normal ; ST/T wave: non-specific T changes; Other findings: abnormal ekg, no STEMI. This EKG was interpreted by Vincent Villatoro MD,ED Provider. 3:00 PM  Radha Turner. RENETTA Berry spoke with Dr. Shari Botello, Consult for Hospitalist. Discussed available diagnostic tests and clinical findings. He is in agreement with care plans as outlined.  He will admit  ANA Waggoner    LABS COMPLETED AND REVIEWED:  Recent Results (from the past 12 hour(s))   EKG, 12 LEAD, INITIAL    Collection Time: 08/01/17 12:28 PM   Result Value Ref Range    Ventricular Rate 96 BPM    Atrial Rate 96 BPM    P-R Interval 170 ms    QRS Duration 94 ms    Q-T Interval 356 ms    QTC Calculation (Bezet) 449 ms    Calculated P Axis 59 degrees    Calculated R Axis 37 degrees    Calculated T Axis 17 degrees    Diagnosis       Normal sinus rhythm  T wave abnormality, consider inferior ischemia  Abnormal ECG  When compared with ECG of 02-APR-2010 11:49,  No significant change was found  Confirmed by Yadi Alas MD.Asim ([id number]) on 8/1/2017 1:54:02 PM     CBC WITH AUTOMATED DIFF    Collection Time: 08/01/17  1:28 PM   Result Value Ref Range    WBC 7.6 3.6 - 11.0 K/uL    RBC 4.71 3.80 - 5.20 M/uL    HGB 13.9 11.5 - 16.0 g/dL    HCT 43.1 35.0 - 47.0 %    MCV 91.5 80.0 - 99.0 FL    MCH 29.5 26.0 - 34.0 PG    MCHC 32.3 30.0 - 36.5 g/dL    RDW 12.9 11.5 - 14.5 %    PLATELET 189 039 - 537 K/uL    NEUTROPHILS 73 32 - 75 %    LYMPHOCYTES 21 12 - 49 %    MONOCYTES 6 5 - 13 % EOSINOPHILS 0 0 - 7 %    BASOPHILS 0 0 - 1 %    ABS. NEUTROPHILS 5.5 1.8 - 8.0 K/UL    ABS. LYMPHOCYTES 1.6 0.8 - 3.5 K/UL    ABS. MONOCYTES 0.5 0.0 - 1.0 K/UL    ABS. EOSINOPHILS 0.0 0.0 - 0.4 K/UL    ABS. BASOPHILS 0.0 0.0 - 0.1 K/UL   METABOLIC PANEL, COMPREHENSIVE    Collection Time: 08/01/17  1:28 PM   Result Value Ref Range    Sodium 141 136 - 145 mmol/L    Potassium 3.9 3.5 - 5.1 mmol/L    Chloride 105 97 - 108 mmol/L    CO2 30 21 - 32 mmol/L    Anion gap 6 5 - 15 mmol/L    Glucose 128 (H) 65 - 100 mg/dL    BUN 16 6 - 20 MG/DL    Creatinine 0.60 0.55 - 1.02 MG/DL    BUN/Creatinine ratio 27 (H) 12 - 20      GFR est AA >60 >60 ml/min/1.73m2    GFR est non-AA >60 >60 ml/min/1.73m2    Calcium 8.8 8.5 - 10.1 MG/DL    Bilirubin, total 0.7 0.2 - 1.0 MG/DL    ALT (SGPT) 19 12 - 78 U/L    AST (SGOT) 17 15 - 37 U/L    Alk. phosphatase 72 45 - 117 U/L    Protein, total 7.5 6.4 - 8.2 g/dL    Albumin 3.8 3.5 - 5.0 g/dL    Globulin 3.7 2.0 - 4.0 g/dL    A-G Ratio 1.0 (L) 1.1 - 2.2     TROPONIN I    Collection Time: 08/01/17  1:28 PM   Result Value Ref Range    Troponin-I, Qt. <0.04 <0.05 ng/mL   MAGNESIUM    Collection Time: 08/01/17  1:28 PM   Result Value Ref Range    Magnesium 2.2 1.6 - 2.4 mg/dL       IMAGING COMPLETED AND REVIEWED:  The following have been ordered and reviewed:  Study Result      INDICATION:  Headache.     EXAM Multiple contiguous helically acquired images were obtained through the  brain without intravenous contrast administration. Sagittal and coronal  reconstructions were performed.     CT dose reduction was achieved through the use of a standardized protocol  tailored for this examination and automatic exposure control for dose  modulation.     FINDINGS:      Comparison study:  None are available. .     Images through the brain reveal geographic hypodensity in the left occipital  lobe lobe. No hemorrhage.  Ventricles are normal in size in appearance.     Bone windows demonstrate no depressed skull fracture.     IMPRESSION: Geographic hypodensity in the left occipital lobe may represent  acute or subacute infarct. Recommend MRI of the brain for further evaluation if  indicated clinically. Telephone report to Dr. Neris Rolle in the emergency room at  1315 hours.             Study Result      EXAM:  XR CHEST PA LAT     INDICATION:   headache, right arm pain, right hand numbness. Symptoms present  for 2 days.     COMPARISON: None.     FINDINGS: PA and lateral radiographs of the chest demonstrate clear lungs. The  cardiac and mediastinal contours are remarkable for mild tortuosity of the  descending aorta. There is mild pulmonary vascular redistribution.  There is  osteopenia with possible wedge deformities in the upper thoracic spine.     IMPRESSION:   Mild central pulmonary vascular congestion  No infiltrate  Possible upper thoracic wedge deformities, for which thoracic radiography is  recommended                 MEDICATIONS GIVEN:  Medications   0.9% sodium chloride infusion (not administered)   sodium chloride (NS) flush 5-10 mL (not administered)   sodium chloride (NS) flush 5-10 mL (not administered)   ondansetron (ZOFRAN) injection 2 mg (not administered)   labetalol (NORMODYNE;TRANDATE) injection 20 mg (not administered)   senna-docusate (PERICOLACE) 8.6-50 mg per tablet 2 Tab (not administered)   famotidine (PEPCID) tablet 20 mg (not administered)   acetaminophen (TYLENOL) tablet 650 mg (not administered)     Or   acetaminophen (TYLENOL) solution 650 mg (not administered)     Or   acetaminophen (TYLENOL) suppository 650 mg (not administered)   enoxaparin (LOVENOX) injection 40 mg (not administered)   aspirin chewable tablet 81 mg (not administered)   insulin lispro (HUMALOG) injection (not administered)   glucose chewable tablet 16 g (not administered)   dextrose (D50W) injection syrg 12.5-25 g (not administered)   glucagon (GLUCAGEN) injection 1 mg (not administered)   sodium chloride 0.9 % bolus infusion 500 mL (0 mL IntraVENous IV Completed 8/1/17 1435)   ondansetron (ZOFRAN) injection 4 mg (4 mg IntraVENous Given 8/1/17 1336)         CLINICAL IMPRESSION:  1. Occipital stroke ([address]          Plan  1. Admission per Hospitalist      2:50 PM  The patient is being admitted to the hospital.  The results of their tests and reasons for their admission have been discussed with them and/or available family. The patient/family has conveyed agreement and understanding for the need to be admitted and for their admission diagnosis. Consultation has been made with the inpatient physician specialist for hospitalization.

## 2017-08-02 ENCOUNTER — APPOINTMENT (OUTPATIENT)
Dept: CT IMAGING | Age: 82
DRG: 065 | End: 2017-08-02
Attending: NURSE PRACTITIONER
Payer: SUBSIDIZED

## 2017-08-02 PROBLEM — I63.9 ACUTE CVA (CEREBROVASCULAR ACCIDENT) (HCC): Status: ACTIVE | Noted: 2017-08-01

## 2017-08-02 LAB
APPEARANCE UR: ABNORMAL
BACTERIA URNS QL MICRO: NEGATIVE /HPF
BILIRUB UR QL: NEGATIVE
CHOLEST SERPL-MCNC: 150 MG/DL
COLOR UR: ABNORMAL
EPITH CASTS URNS QL MICRO: ABNORMAL /LPF
GLUCOSE BLD STRIP.AUTO-MCNC: 105 MG/DL (ref 65–100)
GLUCOSE BLD STRIP.AUTO-MCNC: 132 MG/DL (ref 65–100)
GLUCOSE BLD STRIP.AUTO-MCNC: 140 MG/DL (ref 65–100)
GLUCOSE BLD STRIP.AUTO-MCNC: 151 MG/DL (ref 65–100)
GLUCOSE UR STRIP.AUTO-MCNC: NEGATIVE MG/DL
HDLC SERPL-MCNC: 51 MG/DL
HDLC SERPL: 2.9 {RATIO} (ref 0–5)
HGB UR QL STRIP: NEGATIVE
HYALINE CASTS URNS QL MICRO: ABNORMAL /LPF (ref 0–5)
KETONES UR QL STRIP.AUTO: NEGATIVE MG/DL
LDLC SERPL CALC-MCNC: 79.8 MG/DL (ref 0–100)
LEUKOCYTE ESTERASE UR QL STRIP.AUTO: NEGATIVE
LIPID PROFILE,FLP: NORMAL
NITRITE UR QL STRIP.AUTO: NEGATIVE
PH UR STRIP: 6 [PH] (ref 5–8)
PROT UR STRIP-MCNC: NEGATIVE MG/DL
RBC #/AREA URNS HPF: ABNORMAL /HPF (ref 0–5)
SERVICE CMNT-IMP: ABNORMAL
SP GR UR REFRACTOMETRY: 1.01 (ref 1–1.03)
TRIGL SERPL-MCNC: 96 MG/DL (ref ?–150)
UROBILINOGEN UR QL STRIP.AUTO: 1 EU/DL (ref 0.2–1)
VLDLC SERPL CALC-MCNC: 19.2 MG/DL
WBC URNS QL MICRO: ABNORMAL /HPF (ref 0–4)

## 2017-08-02 PROCEDURE — 97116 GAIT TRAINING THERAPY: CPT

## 2017-08-02 PROCEDURE — 74011636637 HC RX REV CODE- 636/637: Performed by: INTERNAL MEDICINE

## 2017-08-02 PROCEDURE — 92610 EVALUATE SWALLOWING FUNCTION: CPT

## 2017-08-02 PROCEDURE — 97112 NEUROMUSCULAR REEDUCATION: CPT | Performed by: OCCUPATIONAL THERAPIST

## 2017-08-02 PROCEDURE — 97165 OT EVAL LOW COMPLEX 30 MIN: CPT | Performed by: OCCUPATIONAL THERAPIST

## 2017-08-02 PROCEDURE — 36415 COLL VENOUS BLD VENIPUNCTURE: CPT | Performed by: INTERNAL MEDICINE

## 2017-08-02 PROCEDURE — 93306 TTE W/DOPPLER COMPLETE: CPT

## 2017-08-02 PROCEDURE — 82962 GLUCOSE BLOOD TEST: CPT

## 2017-08-02 PROCEDURE — 97161 PT EVAL LOW COMPLEX 20 MIN: CPT

## 2017-08-02 PROCEDURE — 74011250637 HC RX REV CODE- 250/637: Performed by: NURSE PRACTITIONER

## 2017-08-02 PROCEDURE — 74011250637 HC RX REV CODE- 250/637: Performed by: INTERNAL MEDICINE

## 2017-08-02 PROCEDURE — 80061 LIPID PANEL: CPT | Performed by: INTERNAL MEDICINE

## 2017-08-02 PROCEDURE — 74011636320 HC RX REV CODE- 636/320: Performed by: RADIOLOGY

## 2017-08-02 PROCEDURE — 65660000000 HC RM CCU STEPDOWN

## 2017-08-02 PROCEDURE — 74011250636 HC RX REV CODE- 250/636: Performed by: INTERNAL MEDICINE

## 2017-08-02 PROCEDURE — 70496 CT ANGIOGRAPHY HEAD: CPT

## 2017-08-02 RX ORDER — GUAIFENESIN 100 MG/5ML
81 LIQUID (ML) ORAL DAILY
Status: DISCONTINUED | OUTPATIENT
Start: 2017-08-03 | End: 2017-08-03 | Stop reason: HOSPADM

## 2017-08-02 RX ORDER — ATORVASTATIN CALCIUM 10 MG/1
10 TABLET, FILM COATED ORAL
Qty: 30 TAB | Refills: 0 | Status: SHIPPED | OUTPATIENT
Start: 2017-08-02 | End: 2017-10-05 | Stop reason: SDUPTHER

## 2017-08-02 RX ORDER — CANDESARTAN 8 MG/1
32 TABLET ORAL DAILY
Status: DISCONTINUED | OUTPATIENT
Start: 2017-08-02 | End: 2017-08-03 | Stop reason: HOSPADM

## 2017-08-02 RX ORDER — ATORVASTATIN CALCIUM 10 MG/1
10 TABLET, FILM COATED ORAL
Status: DISCONTINUED | OUTPATIENT
Start: 2017-08-02 | End: 2017-08-03 | Stop reason: HOSPADM

## 2017-08-02 RX ORDER — CLOPIDOGREL BISULFATE 75 MG/1
75 TABLET ORAL DAILY
Status: DISCONTINUED | OUTPATIENT
Start: 2017-08-02 | End: 2017-08-02

## 2017-08-02 RX ORDER — CLOPIDOGREL BISULFATE 75 MG/1
75 TABLET ORAL DAILY
Qty: 30 TAB | Refills: 0 | Status: SHIPPED | OUTPATIENT
Start: 2017-08-02 | End: 2017-08-03

## 2017-08-02 RX ADMIN — CLOPIDOGREL 75 MG: 75 TABLET, FILM COATED ORAL at 12:42

## 2017-08-02 RX ADMIN — ATORVASTATIN CALCIUM 10 MG: 10 TABLET, FILM COATED ORAL at 21:46

## 2017-08-02 RX ADMIN — IOPAMIDOL 90 ML: 755 INJECTION, SOLUTION INTRAVENOUS at 10:00

## 2017-08-02 RX ADMIN — DOCUSATE SODIUM -SENNOSIDES 2 TABLET: 50; 8.6 TABLET, COATED ORAL at 21:46

## 2017-08-02 RX ADMIN — INSULIN LISPRO 2 UNITS: 100 INJECTION, SOLUTION INTRAVENOUS; SUBCUTANEOUS at 17:42

## 2017-08-02 RX ADMIN — Medication 10 ML: at 14:00

## 2017-08-02 RX ADMIN — FAMOTIDINE 20 MG: 20 TABLET, FILM COATED ORAL at 09:01

## 2017-08-02 RX ADMIN — INSULIN LISPRO 2 UNITS: 100 INJECTION, SOLUTION INTRAVENOUS; SUBCUTANEOUS at 12:43

## 2017-08-02 RX ADMIN — CANDESARTAN CILEXETIL 32 MG: 8 TABLET ORAL at 12:43

## 2017-08-02 RX ADMIN — ENOXAPARIN SODIUM 40 MG: 40 INJECTION SUBCUTANEOUS at 21:45

## 2017-08-02 RX ADMIN — FAMOTIDINE 20 MG: 20 TABLET, FILM COATED ORAL at 21:46

## 2017-08-02 RX ADMIN — Medication 10 ML: at 21:48

## 2017-08-02 NOTE — PROGRESS NOTES
Bedside and Verbal shift change report given to Addie Zurita RN (oncoming nurse) by MAURA Eli (offgoing nurse). Report included the following information SBAR, Kardex, Procedure Summary, Intake/Output and MAR. Echo done, with bubble study. Stroke Education provided to patient and caregiver / friend and the following topics were discussed    1. Patients personal risk factors for stroke are hypertension and diabetes mellitus    2. Warning signs of Stroke:        * Sudden numbness or weakness of the face, arm or leg, especially on one side of          The body            * Sudden confusion, trouble speaking or understanding        * Sudden trouble seeing in one or both eyes        * Sudden trouble walking, dizziness, loss of balance or coordination        * Sudden severe headache with no known cause      3. Importance of activation Emergency Medical Services ( 9-1-1 ) immediately if experience any warning signs of stroke. 4. Be sure and schedule a follow-up appointment with your primary care doctor or any specialists as instructed. 5. You must take medicine every day to treat your risk factors for stroke. Be sure to take your medicines exactly as your doctor tells you: no more, no less. Know what your medicines are for , what they do. Anti-thrombotics /anticoagulants can help prevent strokes. You are taking the following medicine(s)  labetalol       6. Smoking and second-hand smoke greatly increase your risk of stroke, cardiovascular disease and death. Smoking history never    7. Information provided was Stroke Handouts    8. Documentation of teaching completed in Patient Education Activity and on Care Plan with teaching response noted? yes      If you experience chest pain call 911. Do not drive yourself. I have reviewed discharge instructions with the patient and caregiver. The patient and caregiver verbalized understanding.   Discharge medications reviewed with patient and caregiver and appropriate educational materials and side effects teaching were provided. Therapy recommended home health, wanting for approval.      Bedside and verbal shift change report given to MAURA Amador (oncoming nurse) by Rhonda Dukes RN (offgoing nurse). Report included the following information SBAR, Kardex, Procedure Summary, Intake/Output and MAR.

## 2017-08-02 NOTE — PROGRESS NOTES
8/2/2017   CARE MANAGEMENT NOTE:  CM is following pt for initial discharge planning. EMR reviewed. CM met with pt's grandson at bedside to obtain hx for this needs assessment as pt is non-English speaking. Reportedly, pt is from [place] and is currently visiting her dtr [place] locally. Dtr's home is a two story with pt's bedroom on the second floor. There are three steps thru the garage and eight stairs between floor levels. PTA, pt was ambulatory, indepn with ADLs. She will use [employer] or [employer]. She does not have insurance coverage. Pt does not have home healthcare nor DME for home use. PCP - none. Plan is to discharge to dtr's home and remain in the [place].   Earnestine

## 2017-08-02 NOTE — PROCEDURES
[address]  *** FINAL REPORT ***    Name: Vonda Mccarthy  MRN: [id number]    Inpatient  : 1926  HIS Order #: [id number]  [address] [facility] Visit #: [id number]  Date: 01 Aug 2017    TYPE OF TEST: Cerebrovascular Duplex    REASON FOR TEST  stroke    Right Carotid:-             Proximal               Mid                 Distal  cm/s  Systolic  Diastolic  Systolic  Diastolic  Systolic  Diastolic  CCA:    858.0      13.2                           161.7      22.3  Bulb:  ECA:    117.6      13.0  ICA:    106.0      18.7      115.7      31.7       96.3      17.1  ICA/CCA:  0.7       0.8    ICA Stenosis: <50%    Right Vertebral:-  Finding: Antegrade  Sys:       68.1  Meg:       16.3    Right Subclavian:    Left Carotid:-            Proximal                Mid                 Distal  cm/s  Systolic  Diastolic  Systolic  Diastolic  Systolic  Diastolic  CCA:    964.4      29.3                           118.4      21.5  Bulb:  ECA:    100.6       8.6  ICA:     78.0      22.4       68.9      22.0       48.7      13.2  ICA/CCA:  0.7       1.0    ICA Stenosis: <50%    Left Vertebral:-  Finding: Antegrade  Sys:       54.2  Meg:       13.5    Left Subclavian:    INTERPRETATION/FINDINGS  PROCEDURE:  Evaluation of the extracranial cerebrovascular arteries  with ultrasound (B-mode imaging, pulsed Doppler, color Doppler). Includes the common carotid, internal carotid, external carotid, and  vertebral arteries. FINDINGS: Mild intimal thickening was seen in the bilateral distal CCA   and carotid bulb. IMPRESSION: Findings are consistent with 0-49% stenosis of the right  internal carotid and  0-49% stenosis of the left internal carotid. Vertebrals are patent with antegrade flow. ADDITIONAL COMMENTS    I have personally reviewed the data relevant to the interpretation of  this  study. TECHNOLOGIST: Heather Phalen.  Hetaher Collins  Signed: 2017 08:22 PM    PHYSICIAN: Tommy Perez MD  Signed: 2017 06:32 AM

## 2017-08-02 NOTE — DISCHARGE SUMMARY
Physician Discharge Summary     Patient ID:  Chana Wallace  [id number]  30 y.o.  11/3/1926    Admit date: 8/1/2017    Discharge date: 8/2/2017    Admission Diagnoses: Right sided weakness    Principal Discharge Diagnoses:    Acute stroke    OTHER PROBLEMS ADDRESSEDS  Principal Diagnosis Acute CVA (cerebrovascular accident) [facility])                                            Principal Problem:    Acute CVA (cerebrovascular accident) ([address] (8/1/2017)      Overview: L occipital lobe, Mid and posterior, L temporal lobe, and L thalamus    Active Problems:    Right sided weakness (8/1/2017)      HTN (hypertension), benign (8/1/2017)      Type 2 diabetes mellitus without complication ([address] (1/7/2055)      Generalized headaches (8/1/2017)       Patient Active Problem List   Diagnosis Code    Right sided weakness R53.1    HTN (hypertension), benign I10    Type 2 diabetes mellitus without complication ([address] A87.3    Generalized headaches R51    Acute CVA (cerebrovascular accident) [facility]) I63.9         Hospital Course:   Ms. Lia Diaz is a 79 yo  F with hx of HTN and DM p/w R-sided weakness, found to have acute CVA    Acute CVA (cerebrovascular accident) [facility]): involving multiple lobes, L occipital lobe, Mid and posterior, L temporal lobe, and L thalamus evident on MRI brain. Carotid dopplers okay. TTE no shunt. CTA showed mild to moderate atherosclerotic disease in the posterior cerebral arteries. No acute large vessel arterial occlusion. Neurology changed Plavix back to ASA. Started on statin. Continue BP and DM control. F/u with neuro. HH  PT / OT      HTN (hypertension), benign (8/1/2017): BP controlled. Cont candesartan       Type 2 diabetes mellitus without complication ([address] (9/8/0983): well controlled, A1c 6.7%. Resume Amaryl       Pt discharged in improved and stable condition.      Procedures performed: see above    Imaging studies: see above        PCP: None    Consults: Neurology    Discharge Exam:  Patient Vitals for the past 12 hrs:   Temp Pulse Resp BP SpO2   08/03/17 1052 98.1 °F (36.7 °C) 75 16 150/72 95 %   08/03/17 0730 98.4 °F (36.9 °C) 75 16 146/69 96 %   08/03/17 0334 98.2 °F (36.8 °C) 75 20 140/51 95 %     GEN: A&O, NAD  NEURO: not much weakness or focal findings. CNs grossly in tact      Disposition: home    Patient Instructions:   Current Discharge Medication List      START taking these medications    Details   aspirin 81 mg chewable tablet Take 1 Tab by mouth daily. Qty: 30 Tab, Refills: 0      atorvastatin (LIPITOR) 10 mg tablet Take 1 Tab by mouth nightly. Indications: prevention of cerebrovascular accident  Qty: 30 Tab, Refills: 0         CONTINUE these medications which have NOT CHANGED    Details   candesartan (ATACAND) 32 mg tablet Take 32 mg by mouth daily. glimepiride (AMARYL) 2 mg tablet Take 2 mg by mouth every morning. STOP taking these medications       ibuprofen (MOTRIN) 200 mg tablet Comments:   Reason for Stopping:               Activity: See discharge instructions  Diet: See discharge instructions  Wound Care: See discharge instructions    Follow-up Information     Follow up With Details Comments Contact Info    follow up with a primary care doctor in 1-2 weeks       Mo Owen NP Go on 8/25/2017 Neurology follow-up with nurse practitioner post stroke:  Arrive at [address] [facility],[address]  [phone]            I spent 35 minutes on this discharge.     Signed:  Guerline Dolan MD  8/2/2017  9:07 AM

## 2017-08-02 NOTE — PROGRESS NOTES
PHYSICAL THERAPY:    Consult received and chart reviewed. Pt currently is off floor for test. Will continue to follow pt and complete initial functional assessment when able.

## 2017-08-02 NOTE — ROUTINE PROCESS
Bedside and Verbal shift change report given to Astrid Lee RN (oncoming nurse) by MAURA Eli (offgoing nurse). Report included the following information SBAR, Kardex, Procedure Summary, Intake/Output, MAR, Accordion, Recent Results and Med Rec Status.

## 2017-08-02 NOTE — PROGRESS NOTES
Speech Pathology bedside swallow evaluation/discharge  Patient: Melanie Martinez (59 y.o. female)  Date: 8/2/2017  Primary Diagnosis: Right sided weakness        Precautions: aspiration       ASSESSMENT :  Based on the objective data described below, the patient presents with mild oral dysphagia, mostly due to loose upper and lower dentures. She is very [place], which is premorbid per grandson, but affects her processing significantly. Pam Javed She was admitted with L occipital CVA. Skilled therapy provided by a speech-language pathologist is not indicated at this time. PLAN :  Recommendations:  Ok for regular diet, thin liquids. Discharge Recommendations: none     SUBJECTIVE:   Patient's grandson stated that she usually feeds herself and cooks. She lives with her daughter. OBJECTIVE:     Past Medical History:   Diagnosis Date    Acute CVA (cerebrovascular accident) ([facility] 8/2/2017    L occipital lobe, Mid and posterior, L temporal lobe, and L thalamus    Diabetes ([facility]     Hypertension      Past Surgical History:   Procedure Laterality Date    HX OTHER SURGICAL      tonsils     Prior Level of Function/Home Situation:   Home Situation  Home Environment: Private residence  One/Two Story Residence: Two story  Living Alone: No  Support Systems: Family member(s), Child(cristina)  Patient Expects to be Discharged to[de-identified] Private residence  Current DME Used/Available at Home: None  Diet prior to admission: regular, thins  Current Diet:  Regular, thins.  She passed the STAND   Cognitive and Communication Status:  Neurologic State: Alert  Orientation Level: Oriented to person, Oriented to place  Cognition: Impaired decision making, Decreased command following  Perception:  (severely [place] premorbidly)  Perseveration: No perseveration noted  Safety/Judgement: Lack of insight into deficits  Oral Assessment:  Oral Assessment  Labial: No impairment  Dentition: Upper & lower dentures (somewhat loose)  Oral Hygiene: wfl  Lingual: No impairment  P.O. Trials:  Patient Position: upright in bed  Vocal quality prior to P.O.: No impairment  Consistency Presented: Thin liquid; Solid;Puree  How Presented: Spoon;Straw;Successive swallows (grandson fed)   ORAL PHASE:  Bolus Acceptance: No impairment  Bolus Formation/Control: Impaired (extended chew on hard solids due to loose dentitia)  Type of Impairment: Mastication  Propulsion: Delayed (# of seconds) (with solids only)  Oral Residue: Less than 10% of bolus   PHARYNGEAL PHASE:  Initiation of Swallow: No impairment  Laryngeal Elevation: Functional  Aspiration Signs/Symptoms: Clear throat (occasionally on hard solids. suspected she swallowed some solid bits whole)      She is 80years old, so good probability of esophageal dysmotility. RN STATES that she took her pills well without issues. NOMS:   The NOMS functional outcome measure was used to quantify this patient's level of swallowing impairment. Based on the NOMS, the patient was determined to be at level 6 for swallow function     G Codes: In compliance with CMSs Claims Based Outcome Reporting, the following G-code set was chosen for this patient based the use of the NOMS functional outcome to quantify this patient's level of swallowing impairment. Using the NOMS, the patient was determined to be at level 6 for swallow function which correlates with the CI= 1-19% level of severity. Based on the objective assessment provided within this note, the current, goal, and discharge g-codes are as follows:    Swallow  Swallowing:   Swallow Current Status CI= 1-19%   Swallow Goal Status CI= 1-19%   Swallow D/C Status CI= 1-19%        NOMS Swallowing Levels:  Level 1 (CN): NPO  Level 2 (CM): NPO but takes consistency in therapy  Level 3 (CL): Takes less than 50% of nutrition p.o. and continues with nonoral feedings; and/or safe with mod cues; and/or max diet restriction  Level 4 (CK):  Safe swallow but needs mod cues; and/or mod diet restriction; and/or still requires some nonoral feeding/supplements  Level 5 (CJ): Safe swallow with min diet restriction; and/or needs min cues  Level 6 (CI): Independent with p.o.; rare cues; usually self cues; may need to avoid some foods or needs extra time  Level 7 ([address] [place] [address]): Independent for all p.o.  SAMM. (2003). National Outcomes Measurement System (NOMS): Adult Speech-Language Pathology User's Guide. Pain:Pain Scale 1: Numeric (0 - 10)  Pain Intensity 1: 0     After treatment:   [] Patient left in no apparent distress sitting up in chair  [x] Patient left in no apparent distress in bed  [] Call bell left within reach  [x] Nursing notified  [x] Caregiver present  [] Bed alarm activated    COMMUNICATION/EDUCATION:   The patients plan of care including findings, recommendations, and recommended diet changes were discussed with: Registered Nurse.  [] Posted safety precautions in patient's room. [x] Patient/family have participated as able and agree with findings and recommendations. [] Patient is unable to participate in plan of care at this time.     Thank you for this referral.  Tamia Hamilton, SLP  Time Calculation: 15 mins

## 2017-08-02 NOTE — DISCHARGE INSTRUCTIONS
HOSPITALIST DISCHARGE INSTRUCTIONS  NAME: Ellen Lucio   :  11/3/1926   MRN:  [id number]     Date/Time:  2017 9:07 AM    ADMIT DATE: 2017     DISCHARGE DATE: 8/3/2017     PRINCIPAL DISCHARGE DIAGNOSES:  stroke    MEDICATIONS:  · It is important that you take the medication exactly as they are prescribed. Note the changes and additions to your medications. Be sure you understand these changes before you are discharged today. · Keep your medication in the bottles provided by the pharmacist and keep a list of the medication names, dosages, and times to be taken in your wallet. · Do not take other medications without consulting your doctor. Pain Management: per above medications    What to do at Home    Recommended diet:  Cardiac Diet and Diabetic Diet    Recommended activity: Activity as tolerated    If you experience any of the following symptoms then please call your primary care physician or return to the emergency room if you cannot get hold of your doctor:  Severe headache, dizziness, facial droop, slurred speech, weakness, numbness, or other severe concerning symptoms that brought you to the hospital in the first place    Follow Up: Follow-up Information     Follow up With Details Comments Contact Info    follow up with a primary care doctor in 1-2 weeks       Dede Hall NP Go on 2017 Neurology follow-up with nurse practitioner post stroke:  Arrive at [address] [place] [address]  [facility]  [phone]              Information obtained by :  I understand that if any problems occur once I am at home I am to contact my physician. I understand and acknowledge receipt of the instructions indicated above.                                                                                                                                            Physician's or R.N.'s Signature                                                                  Date/Time Patient or Representative Signature                                                          Date/Time

## 2017-08-02 NOTE — PROGRESS NOTES
Occupational Therapy  Order received and chart reviewed, patient currently off the floor at CTA and per Neuro not discharging this date. Will follow up at later time.   Urszula Narvaez, OTR/L

## 2017-08-02 NOTE — PROGRESS NOTES
[address]  [address] [facility], [address] [place] [address]  [phone]      Medical Progress Note      NAME: Tigist Pearce   :  11/3/1926  MRM:  [id number]    Date/Time: 2017          Subjective:     Chief Complaint:  F/u CVA    Chart/notes/labs/studies reviewed, patient examined at bedside. Reports mild HA, dizziness. No CP, SOB, abdominal pain            Objective:       Vitals:          Last 24hrs VS reviewed since prior progress note. Most recent are:    Visit Vitals    /67 (BP 1 Location: Left arm, BP Patient Position: At rest)    Pulse 69    Temp 98.5 °F (36.9 °C)    Resp 18    Ht 5' 4\" (1.626 m)    Wt 54.4 kg (120 lb)    SpO2 93%    BMI 20.6 kg/m2     SpO2 Readings from Last 6 Encounters:   17 93%   09/10/15 95%          Intake/Output Summary (Last 24 hours) at 17 0911  Last data filed at 17 0403   Gross per 24 hour   Intake                0 ml   Output              350 ml   Net             -350 ml          Exam:     Physical Exam:    Gen:  Elderly, well-developed, well-nourished, in no acute distress  HEENT:  Sclerae nonicteric, hearing intact to voice, mucous membranes moist  Neck:  Supple, without masses. Resp:  No accessory muscle use, CTAB without wheezes, rales, or rhonchi  Card: RRR, without m/r/g. No LE edema. Abd:  +bowel sounds, soft, NTTP, nondistended. Neuro: Face symmetric, tongue midline, speech fluent, CNs 3-12 in tact. Follows commands appropriately. Not much weakness noted, 4+/5 strength RUE and RLE, 5/5 strength LUE and LLE  Psych:  Alert, oriented x 3.  Good insight     Medications Reviewed: (see below)    Lab Data Reviewed: (see below)    ______________________________________________________________________    Medications:     Current Facility-Administered Medications   Medication Dose Route Frequency    clopidogrel (PLAVIX) tablet 75 mg  75 mg Oral DAILY    atorvastatin (LIPITOR) tablet 10 mg  10 mg Oral QHS    0.9% sodium chloride infusion  75 mL/hr IntraVENous CONTINUOUS    sodium chloride (NS) flush 5-10 mL  5-10 mL IntraVENous Q8H    sodium chloride (NS) flush 5-10 mL  5-10 mL IntraVENous PRN    ondansetron (ZOFRAN) injection 2 mg  2 mg IntraVENous Q6H PRN    labetalol (NORMODYNE;TRANDATE) injection 20 mg  20 mg IntraVENous Q10MIN PRN    senna-docusate (PERICOLACE) 8.6-50 mg per tablet 2 Tab  2 Tab Oral QHS    famotidine (PEPCID) tablet 20 mg  20 mg Oral Q12H    acetaminophen (TYLENOL) tablet 650 mg  650 mg Oral Q4H PRN    Or    acetaminophen (TYLENOL) solution 650 mg  650 mg Per NG tube Q4H PRN    Or    acetaminophen (TYLENOL) suppository 650 mg  650 mg Rectal Q4H PRN    enoxaparin (LOVENOX) injection 40 mg  40 mg SubCUTAneous Q24H    insulin lispro (HUMALOG) injection   SubCUTAneous QID WITH MEALS    glucose chewable tablet 16 g  4 Tab Oral PRN    dextrose (D50W) injection syrg 12.5-25 g  12.5-25 g IntraVENous PRN    glucagon (GLUCAGEN) injection 1 mg  1 mg IntraMUSCular PRN            Lab Review:     Recent Labs      08/01/17   1328   WBC  7.6   HGB  13.9   HCT  43.1   PLT  247     Recent Labs      08/01/17   1328   NA  141   K  3.9   CL  105   CO2  30   GLU  128*   BUN  16   CREA  0.60   CA  8.8   MG  2.2   ALB  3.8   SGOT  17   ALT  19     No components found for: GLPOC  No results for input(s): PH, PCO2, PO2, HCO3, FIO2 in the last 72 hours. No results for input(s): INR in the last 72 hours. No lab exists for component: INREXT  No results found for: SDES  No results found for: CULT         Assessment / Plan:       79 yo  F w/ hx of HTN, DM p/w headache, weakness, found to have acute CVA      Acute CVA (cerebrovascular accident) [facility]): involving multiple lobes, L occipital lobe, Mid and posterior, L temporal lobe, and L thalamus evident on MRI brain.  Carotid dopplers okay  -- Neurology ordered CTA   -- TTE pending  -- changing antiplatelet to Plavix per neuro  -- cont statin  -- BP and DM control  -- PT / OT to evaluate  -- given extent of disease and age, may be better to keep pt in house for another day as we start pt on antithrombotics.  Discussed with neurology     HTN (hypertension), benign (8/1/2017): BP controlled   -- cont candesartan      Type 2 diabetes mellitus without complication ([facility] (3/7/9939): well controlled, A1c 6.7%  -- cont SSI  -- can hold Amaryl for now given good glycemic control      Total time spent with patient:35 minutes, d/w neurology, son at [address] discussed with: Patient, Nursing Staff and >50% of time spent in counseling and coordination of care    Discussed:  Care Plan    Prophylaxis:  Lovenox    Disposition:  Home w/Family           ___________________________________________________    Attending Physician: Pedro Can MD

## 2017-08-02 NOTE — PROGRESS NOTES
Problem: Mobility Impaired (Adult and Pediatric)  Goal: *Therapy Goal (Edit Goal, Insert Text)  Physical Therapy Goals  Initiated 8/2/2017  1. Patient will move from supine to sit and sit to supine in bed with modified independence within 4 day(s). 2. Patient will transfer from bed to chair and chair to bed with modified independence using the least restrictive device within 4 day(s). 3. Patient will perform sit to stand with modified independence within 4 day(s). 4. Patient will ambulate with modified independence for 75 feet with the least restrictive device within 4 day(s). 5. Patient will ascend/descend 12 stairs with one handrail(s) with supervision/set-up within 4 day(s). PHYSICAL THERAPY EVALUATION  Patient: Leo Sun (19 y.o. 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 Pt is from [place] and staying with family while in [address] [place] [address]. Pt does not speak [employer] and geovanna is the . Pt will be staying with family in a 2-story home and she will be staying on the second level. Pt was independent with all functional mobility PTA and she currently has functional strength in all extremities. Pt is independent with rolling and sitting up on EOB. Pt  Needs CGA for transfers d/t impulsivity and right side neglect. Pt has unstable balance in standing and requires RW for increased safety during standing and ambulation activities. Pt able to ambulate down the hallway to the exit and practice going up/dowm 10 steps using the left rail going down and right rail going up with CGA. Pt appears to be very close to her baseline functional level except for the impulsive movements and right side neglect which limits safety.   Pt will benefit with therapy for training in awareness of environment and safety awareness training for proper management of right side neglect and decreasing impulsivity with activities toward maximizing functional independence. Recommend HHPT at discharge. Patient will benefit from skilled intervention to address the above impairments. Patients rehabilitation potential is considered to be Good  Factors which may influence rehabilitation potential include:   [ ]         None noted  [ ]         Mental ability/status  [X]         Medical condition  [X]         Home/family situation and support systems  [X]         Safety awareness  [ ]         Pain tolerance/management  [ ]         Other:        PLAN :  Recommendations and Planned Interventions:  [X]           Bed Mobility Training             [ ]    Neuromuscular Re-Education  [X]           Transfer Training                   [ ]    Orthotic/Prosthetic Training  [X]           Gait Training                         [ ]    Modalities  [X]           Therapeutic Exercises           [ ]    Edema Management/Control  [X]           Therapeutic Activities            [X]    Patient and Family Training/Education  [ ]           Other (comment):     Frequency/Duration: Patient will be followed by physical therapy  daily to address goals. Discharge Recommendations: Home Health  Further Equipment Recommendations for Discharge: rolling walker       SUBJECTIVE:   Patient stated Pt does not speak english; grandson does the interpertations.       OBJECTIVE DATA SUMMARY:   HISTORY:    Past Medical History:   Diagnosis Date    Acute CVA (cerebrovascular accident) ([address] 8/2/2017     L occipital lobe, Mid and posterior, L temporal lobe, and L thalamus    Diabetes ([address]      Hypertension       Past Surgical History:   Procedure Laterality Date    HX OTHER SURGICAL         tonsils     Prior Level of Function/Home Situation: independent with al mobility  Personal factors and/or comorbidities impacting plan of care:      Home Situation  Home Environment: Private residence  # Steps to Enter: 4  Rails to [employer]: Yes  [employer] : Right  Wheelchair Ramp: No  One/Two Story Residence: Two story  # of Interior Steps: 15  Interior Rails: Right  Lift Chair Available: No  Living Alone: No  Support Systems: Family member(s)  Patient Expects to be Discharged to[de-identified] Private residence  Current DME Used/Available at Home: None     EXAMINATION/PRESENTATION/DECISION MAKING:   Critical Behavior:  Neurologic State: Alert  Orientation Level: Oriented to person, Oriented to place  Cognition: Follows commands, Appropriate for age attention/concentration  Safety/Judgement: Decreased awareness of need for assistance, Decreased insight into deficits  Hearing: Auditory  Auditory Impairment: Hard of hearing, bilateral  Skin:  Age appropriate  Edema: no edema noted  Range Of Motion:  AROM: Generally decreased, functional           PROM: Generally decreased, functional           Strength:    Strength: Generally decreased, functional                    Tone & Sensation:   Tone: Normal              Sensation: Intact               Coordination:  Coordination: Generally decreased, functional  Vision:   Corrective Lenses: Glasses  Functional Mobility:  Bed Mobility:  Rolling: Independent  Supine to Sit: Stand-by asssistance  Sit to Supine: Supervision  Scooting: Supervision  Transfers:  Sit to Stand: Contact guard assistance  Stand to Sit: Contact guard assistance  Stand Pivot Transfers: Contact guard assistance     Bed to Chair: Contact guard assistance              Balance:   Sitting: Intact; Without support  Standing: Impaired  Standing - Static: Good; Unsupported  Standing - Dynamic : Fair  Ambulation/Gait Training:  Distance (ft): 200 Feet (ft)  Assistive Device: Walker, rolling;Gait belt  Ambulation - Level of Assistance: Contact guard assistance     Gait Description (WDL): Exceptions to WDL  Gait Abnormalities: Decreased step clearance; Path deviations; Other (Runs into objects on right side)        Base of Support: Narrowed Speed/Shannan: Accelerated; Other (comment) (Impulsivity)  Step Length: Right lengthened;Left lengthened                                           Stairs:  Number of Stairs Trained: 10  Stairs - Level of Assistance: Contact guard assistance              Rail Use: Left      Therapeutic Exercises:   Not done during this Rx session     Functional Measure:  Barthel Index:      Bathin  Bladder: 10  Bowels: 10  Groomin  Dressin  Feedin  Mobility: 5  Stairs: 5  Toilet Use: 5  Transfer (Bed to Chair and Back): 10  Total: 60         Barthel and G-code impairment scale:  Percentage of impairment CH  0% CI  1-19% CJ  20-39% CK  40-59% CL  60-79% CM  80-99% CN  100%   Barthel Score 0-100 100 99-80 79-60 59-40 20-39 1-19    0   Barthel Score 0-20 20 17-19 13-16 9-12 5-8 1-4 0      The Barthel ADL Index: Guidelines  1. The index should be used as a record of what a patient does, not as a record of what a patient could do. 2. The main aim is to establish degree of independence from any help, physical or verbal, however minor and for whatever reason. 3. The need for supervision renders the patient not independent. 4. A patient's performance should be established using the best available evidence. Asking the patient, friends/relatives and nurses are the usual sources, but direct observation and common sense are also important. However direct testing is not needed. 5. Usually the patient's performance over the preceding 24-48 hours is important, but occasionally longer periods will be relevant. 6. Middle categories imply that the patient supplies over 50 per cent of the effort. 7. Use of aids to be independent is allowed. Lb Murdock., Barthel, D.W. (0535). Functional evaluation: the Barthel Index. [address] [facility] (14)2. Maco Bermeo prudencio MARÍA ELENA Arauz, Slava Romo., Tawnya Eckert, Cl Ware, [facility] ().  Measuring the change indisability after inpatient rehabilitation; comparison of the responsiveness of the Barthel Index and Functional Bradley Measure. Journal of Neurology, Neurosurgery, and Psychiatry, 66(4), 403-971. IVAN Rudolph, KATHY Rodriguez, & Becca Sales M.A. (2004.) Assessment of post-stroke quality of life in cost-effectiveness studies: The usefulness of the Barthel Index and the EuroQoL-5D. Quality of Life Research, 13, 462-21         G codes: In compliance with CMSs Claims Based Outcome Reporting, the following G-code set was chosen for this patient based on their primary functional limitation being treated: The outcome measure chosen to determine the severity of the functional limitation was the Barthel Index with a score of 60/100 which was correlated with the impairment scale. · Mobility - Walking and Moving Around:               - CURRENT STATUS:    CJ - 20%-39% impaired, limited or restricted               - GOAL STATUS:           CI - 1%-19% impaired, limited or restricted               - D/C STATUS:                       ---------------To be determined---------------      Physical Therapy Evaluation Charge Determination   History Examination Presentation Decision-Making   LOW Complexity : Zero comorbidities / personal factors that will impact the outcome / POC LOW Complexity : 1-2 Standardized tests and measures addressing body structure, function, activity limitation and / or participation in recreation  LOW Complexity : Stable, uncomplicated  LOW Complexity : FOTO score of       Based on the above components, the patient evaluation is determined to be of the following complexity level: LOW      Pain:  Pain Scale 1: Numeric (0 - 10)  Pain Intensity 1: 0              Activity Tolerance:   Pt impulsive with activity but was eager to move and ambulate. Please refer to the flowsheet for vital signs taken during this treatment.   After treatment:   [ ]         Patient left in no apparent distress sitting up in chair  [X]         Patient left in no apparent distress in bed  [X]         Call bell left within reach  [X]         Nursing notified  [X]         Caregiver present  [ ]         Bed alarm activated      COMMUNICATION/EDUCATION:   The patients plan of care was discussed with: Registered Nurse.  [X]         Fall prevention education was provided and the patient/caregiver indicated understanding. [X]         Patient/family have participated as able in goal setting and plan of care. [X]         Patient/family agree to work toward stated goals and plan of care. [ ]         Patient understands intent and goals of therapy, but is neutral about his/her participation. [ ]         Patient is unable to participate in goal setting and plan of care.      Thank you for this referral.  Hanane Maldonado, PT   Time Calculation: 28 mins

## 2017-08-02 NOTE — CONSULTS
[facility]  [address]  [phone]     Inpatient Neurology Consult  [facility]    Name:   Mary Castillo record #: [id number]  Admission Date: 8/1/2017  Consult Date:  08/02/17    Referring Provider: Dr. Kimo Olguin  Chief Complaint:  R extremity weakness  Source of Hx:  Chart, son, pt, Dr. Willian Castro  _____________________________________________________________________    HISTORY OF PRESENT ILLNESS:   Alok Marie is a 80 y.o. female with PMH of DMT2, HTN and HLD. The Neurology Service is asked to evaluate for acute CVA, after admission for R extremity weakness. Per son and chart review, patient developed R arm weakness and burning pain upon waking up Saturday AM.  She had difficulty walking because she was off balance per son and he brought her into ED. Also reported R chest burning but says it was related to the R shoulder/arm pain which is still present but reduced. Son states she feels like she is doing better today. Past Medical History:   Diagnosis Date    Acute CVA (cerebrovascular accident) ([address] 8/2/2017    L occipital lobe, Mid and posterior, L temporal lobe, and L thalamus    Diabetes ([address]     Hypertension      Past Surgical History:   Procedure Laterality Date    HX OTHER SURGICAL      tonsils     No family history on file. Social History     Social History    Marital status:      Spouse name: N/A    Number of children: N/A    Years of education: N/A     Occupational History    Not on file. Social History Main Topics    Smoking status: Never Smoker    Smokeless tobacco: Not on file    Alcohol use No    Drug use: Not on file    Sexual activity: Not on file     Other Topics Concern    Not on file     Social History Narrative       Objective  Allergies:   No Known Allergies  Outpatient Meds  No current facility-administered medications on file prior to encounter.       Current Outpatient Prescriptions on File Prior to Encounter   Medication Sig Dispense Refill    glimepiride (AMARYL) 2 mg tablet Take 2 mg by mouth every morning.          Inpatient Meds    Current Facility-Administered Medications:     0.9% sodium chloride infusion, 75 mL/hr, IntraVENous, CONTINUOUS, Mortimer Collum, MD, Last Rate: 75 mL/hr at 08/01/17 2056, 75 mL/hr at 08/01/17 2056    sodium chloride (NS) flush 5-10 mL, 5-10 mL, IntraVENous, Q8H, Mortimer Collum, MD    sodium chloride (NS) flush 5-10 mL, 5-10 mL, IntraVENous, PRN, Mortimer Collum, MD    ondansetron [facility]) injection 2 mg, 2 mg, IntraVENous, Q6H PRN, Mortimer Collum, MD    labetalol (NORMODYNE;TRANDATE) injection 20 mg, 20 mg, IntraVENous, Q10MIN PRN, Mortimer Collum, MD    senna-docusate (PERICOLACE) 8.6-50 mg per tablet 2 Tab, 2 Tab, Oral, QHS, Mortimer Collum, MD, 2 Tab at 08/01/17 2201    famotidine (PEPCID) tablet 20 mg, 20 mg, Oral, Q12H, Mortimer Collum, MD, 20 mg at 08/01/17 2052    acetaminophen (TYLENOL) tablet 650 mg, 650 mg, Oral, Q4H PRN, 650 mg at 08/01/17 2052 **OR** acetaminophen (TYLENOL) solution 650 mg, 650 mg, Per NG tube, Q4H PRN **OR** acetaminophen (TYLENOL) suppository 650 mg, 650 mg, Rectal, Q4H PRN, Mortimer Collum, MD    enoxaparin (LOVENOX) injection 40 mg, 40 mg, SubCUTAneous, Q24H, Mortimer Collum, MD, 40 mg at 08/01/17 2053    aspirin chewable tablet 81 mg, 81 mg, Oral, DAILY, Mortimer Collum, MD    insulin lispro (HUMALOG) injection, , SubCUTAneous, QID WITH MEALS, Mortimer Collum, MD, Stopped at 08/01/17 1900    glucose chewable tablet 16 g, 4 Tab, Oral, PRN, Mortimer Collum, MD    dextrose (D50W) injection syrg 12.5-25 g, 12.5-25 g, IntraVENous, PRN, Mortimer Collum, MD    glucagon [facility]) injection 1 mg, 1 mg, IntraMUSCular, PRN, Mortimer Collum, MD    atorvastatin (LIPITOR) tablet 40 mg, 40 mg, Oral, QHS, Marnie Sherwood MD, 40 mg at 08/01/17 2201    PHYSICAL EXAM  Patient Vitals for the past 12 hrs:   Temp Pulse Resp BP SpO2 08/02/17 0746 98.5 °F (36.9 °C) 69 18 153/67 93 %   08/02/17 0404 98.3 °F (36.8 °C) 70 18 164/68 96 %   08/02/17 0145 98.6 °F (37 °C) 65 18 146/84 95 %   08/01/17 2246 - 67 - - -      General: Petite elderly  female in NAD, providing history through son due to language barrier   Psych: Affect is calm  anxious, cooperative, pleasant   Neck: supple, nontender,  No bruit   Heart: regular rhythm and rate  Lungs: clear BBS   Extremities: no Le edema       Skin: no rashes      Neurological Examination:    Mental Status:  Alert, oriented x 4, Good insight and judgement    Commands:  following    Language:  Comprehension: appears intact when son speaking to pt    Dysarthria:  no    Speech: no aphasia     Cranial Nerves:            I: smell   Not tested    II: visual acuity    deferred    II: visual fields   Full to confrontation    II: pupils   Equal, round, reactive to light    II: optic disc   Not examined    III,VII:   no ptosis of either eyelid    III,IV,VI: extraocular muscles    Full EOM, no nystagmus, no intranuclear opthalmoplegia    V: mastication   symmetrical    V: facial sensation:    Equal V1, V2 and V3 bilaterally with LT    VII: facial muscle function     Symmetric, no facial droop    VIII: hearing   Equal bilaterally    IX: soft palate elevation    Uvula midline, elevates symetrically    XI: trapezius strength    5/5    XI: sternocleidomastoid strength   5/5    XI: neck flexion strength    5/5     XII: tongue    Protrudes midline, no fasciculations or atrophy      Strength/Motor   Drift:       None          Bulk:  appears symmetric            Tone:  normal      Deltoid Biceps Triceps Wrist Extension Finger Abduction   L 5 5 5 5 5   R 5- 5- 5- 5- 5-      Hip Flexion Hip Extension Knee Flexion Knee Extension Ankle Dorsiflexion Ankle Plantarflexion   L 5 5 5 5 5 5   R 5 5 5 5 5 5      Reflexes:    BR Brachial Patellar Achilles Babinski Startle Glabellar   L 2/4+ 2/4+ 2/4+ NT  downgoing NT NT   R 2/4+ 2/4+ /4+ NT downgoing NT NT      Sensory: intact on proximal & distal extremity w/ LT, pressure, temp, and proprioception bilaterally   Coordination: FNF and HTS:  Intact on L, Dysmetria on R   Tremors:  no resting tremors, no intention tremors   Gait: deferred     *VELMA:  Unable to assess due to reduced comprehension, agitation or reduced LOC. Labs Reviewed  Recent Results (from the past 12 hour(s))   GLUCOSE, POC    Collection Time: 08/01/17 10:35 PM   Result Value Ref Range    Glucose (POC) 119 (H) 65 - 100 mg/dL    Performed by April Chaves (PCT)    LIPID PANEL    Collection Time: 08/02/17  1:44 AM   Result Value Ref Range    LIPID PROFILE          Cholesterol, total 150 <200 MG/DL    Triglyceride 96 <150 MG/DL    HDL Cholesterol 51 MG/DL    LDL, calculated 79.8 0 - 100 MG/DL    VLDL, calculated 19.2 MG/DL    CHOL/HDL Ratio 2.9 0 - 5.0     GLUCOSE, POC    Collection Time: 08/02/17  6:58 AM   Result Value Ref Range    Glucose (POC) 105 (H) 65 - 100 mg/dL    Performed by April Chaves (PCT)      Imaging  Reviewed:   CT Results (recent):    [facility]   [facility] HEAD WO CONT   Narrative INDICATION:  Headache. EXAM Multiple contiguous helically acquired images were obtained through the  brain without intravenous contrast administration. Sagittal and coronal  reconstructions were performed. CT dose reduction was achieved through the use of a standardized protocol  tailored for this examination and automatic exposure control for dose  modulation. FINDINGS:     Comparison study:  None are available. .    Images through the brain reveal geographic hypodensity in the left occipital  lobe lobe. No hemorrhage. Ventricles are normal in size in appearance. Bone windows demonstrate no depressed skull fracture. IMPRESSION: Geographic hypodensity in the left occipital lobe may represent  acute or subacute infarct.  Recommend MRI of the brain for further evaluation if  indicated clinically. Telephone report to Dr. Ronaldo Connelly in the emergency room at  1315 hours. MRI Results (recent):    Results from [place] encounter on 08/01/17   MRI BRAIN WO CONT   Narrative INDICATION: CVA broad to emergency department today complaining of persistent  global headache associated with right-sided pain and weakness, decreased  appetite and fatigue. COMPARISON: Earlier CT    EXAM: Sagittal T1-weighted spin-echo, axial FLAIR and T2-weighted fast  spin-echo, axial diffusion weighted echo planar, axial T1-weighted spin-echo,  axial gradient echo, and coronal T2-weighted fast spin-echo MR images of the  brain are obtained. FINDINGS: Extensive restricted diffusion is shown in the left occipital lobe and  the mid and posterior medial left temporal lobe. There are furthermore 2 small  foci of restricted diffusion also demonstrated in the left thalamus, one in the  posterolateral left thalamus and the other in the medial left thalamus. These  findings are consistent with acute infarction. There is associated FLAIR and T2  signal abnormality in the same distribution. There are numerous foci of  nonspecific white matter signal alteration in the periventricular and superior  cerebrum which could be on the basis of chronic small vessel ischemic disease  the white matter. The ventricles and cortical sulci are normal in size and  contour. The vascular flow voids at the base the brain appear normal in  conspicuity. The unenhanced sella, optic chiasm, orbits and paranasal sinuses  are normal.    IMPRESSION: Acute infarction involving left occipital lobe, mid and posterior  medial left temporal lobe, and left thalamus.           _____________________________________________________________________    Review of Systems: unable to obtain with language barrier  _____________________________________________________________________  Impression  80 y.o. female with onset of  R extremity weakness and HA. Exam findings of R arm/leg dysmetria and mild weakness on R. Imaging reviewed: MRI brain with acute infarcts noted below, Carotids 0-49% bilat. Notable Labs are LDL of 79. With PMH of HTN and DMT2, patient was at high risk of potential CVA. Refer to plan below. Assessment:  1. Acute CVA:  L occipital, Mid and posterior L temporal lobe, and L thalamus  2. HTN    3. DMT2-- A1c 6.7%  4. Hyperlipidemia--no statin PTA    Plan  Testing Pending:  Therapy evals, TTE and CTA head  · Medications now and post discharge:   Plavix and Lipitor 10mg for LDL 79  · Neurovascular checks and fall precautions  · BP goals x 24hr post TPA (<180/105) / CVA: GOAL:  BP <220/120   24hr BP goal Post CVA = < 140/90 or defined by IM/FP/Cardiology (hx DM/geriatric etc)  · ST, OT, PT CX: 12hrs of CVA .: Do not  feel IP rehab is needed  · Case management consult:  discharge planning  · Daily CVA education by RN. Educated on BEFAST and when to call 911. Also educated on Stroke Location,  · Risk factor discussion: medication changes per Plan, individual TIA or CVA risk factors noted in Assessment and secondary risk factor reduction on OP basis with PCP      Will have OP Neurology appt in Clinic within 4w post discharge, placed in discharge follow-up   ·   Continue great care by collaborating care team and nursing staff. ·  Testing results discussed with patient and/or family and any questions were answered. My collaborating care team physician may have further recommendations.     On DVT Prophylaxis yes no   Continue lovenox while inpatient x      Care Plan discussed with:  Patient x   Family---son x   RN x   Care Manager x   Consultant/Specialist:  x   Patient will be discussed with Dr. Anibal Rao  ______________________________________________________________  Hospital Problems  Date Reviewed: 8/2/2017          Codes Class Noted POA    Right sided weakness ICD-10-CM: R53.1  ICD-9-CM: 728.87  8/1/2017 Yes        HTN (hypertension), benign ICD-10-CM: I10  ICD-9-CM: 401.1  8/1/2017 Yes        Type 2 diabetes mellitus without complication [facility] ABF-91-DH: E11.9  ICD-9-CM: 250.00  8/1/2017 Yes        Generalized headaches ICD-10-CM: R51  ICD-9-CM: 784.0  8/1/2017 Yes        * (Principal)Acute CVA (cerebrovascular accident) [facility]) ICD-10-CM: I63.9  ICD-9-CM: 434.91  8/1/2017 Yes    Overview Signed 8/2/2017  8:54 AM by Jennifer Pope NP     L occipital lobe, Mid and posterior, L temporal lobe, and L thalamus                   *Thank you for allowing Jakedelfino Shivam Fiore, to participate in the care of your patient. ---KAROLINE Pierre  ================================================    ADDENDUM--> Collaborating Care Team Physician:  ==============================================================    Attending Addendum    Reviewed consult note by NP Raj Morrissey. Agree with hx as obtained by her. Patient independently interviewed and examined. HPI  This is a 72-year-old female who was at home when she had sudden onset of right-sided weakness and dizziness. Patient was brought to the emergency room by her family. Once admitted she was found to have a left PCA infarct. Stroke risk factors include hypertension hyperlipidemia and diabetes. When I came to see the patient she still had a possible right visual field cut, and some right upper extremity weakness. Patient denied any further diplopia. Patient denied any nausea. Patient denied any headache. Patient is [place]-speaking and her family helped with interpretation of this history and physical exam.    MEDS  No current facility-administered medications on file prior to encounter. Current Outpatient Prescriptions on File Prior to Encounter   Medication Sig Dispense Refill    glimepiride (AMARYL) 2 mg tablet Take 2 mg by mouth every morning.          CLINICAL DATA REVIEW  IMAGING: MRI brain: left PCA infarct(I personally reviewed these images in PACS and this is my impression  CTA: neg  ECHO EF 60%  TELEMETRY NSR    LABS  Results for orders placed or performed during the hospital encounter of 08/01/17   CBC WITH AUTOMATED DIFF   Result Value Ref Range    WBC 7.6 3.6 - 11.0 K/uL    RBC 4.71 3.80 - 5.20 M/uL    HGB 13.9 11.5 - 16.0 g/dL    HCT 43.1 35.0 - 47.0 %    MCV 91.5 80.0 - 99.0 FL    MCH 29.5 26.0 - 34.0 PG    MCHC 32.3 30.0 - 36.5 g/dL    RDW 12.9 11.5 - 14.5 %    PLATELET 102 006 - 666 K/uL    NEUTROPHILS 73 32 - 75 %    LYMPHOCYTES 21 12 - 49 %    MONOCYTES 6 5 - 13 %    EOSINOPHILS 0 0 - 7 %    BASOPHILS 0 0 - 1 %    ABS. NEUTROPHILS 5.5 1.8 - 8.0 K/UL    ABS. LYMPHOCYTES 1.6 0.8 - 3.5 K/UL    ABS. MONOCYTES 0.5 0.0 - 1.0 K/UL    ABS. EOSINOPHILS 0.0 0.0 - 0.4 K/UL    ABS. BASOPHILS 0.0 0.0 - 0.1 K/UL   METABOLIC PANEL, COMPREHENSIVE   Result Value Ref Range    Sodium 141 136 - 145 mmol/L    Potassium 3.9 3.5 - 5.1 mmol/L    Chloride 105 97 - 108 mmol/L    CO2 30 21 - 32 mmol/L    Anion gap 6 5 - 15 mmol/L    Glucose 128 (H) 65 - 100 mg/dL    BUN 16 6 - 20 MG/DL    Creatinine 0.60 0.55 - 1.02 MG/DL    BUN/Creatinine ratio 27 (H) 12 - 20      GFR est AA >60 >60 ml/min/1.73m2    GFR est non-AA >60 >60 ml/min/1.73m2    Calcium 8.8 8.5 - 10.1 MG/DL    Bilirubin, total 0.7 0.2 - 1.0 MG/DL    ALT (SGPT) 19 12 - 78 U/L    AST (SGOT) 17 15 - 37 U/L    Alk.  phosphatase 72 45 - 117 U/L    Protein, total 7.5 6.4 - 8.2 g/dL    Albumin 3.8 3.5 - 5.0 g/dL    Globulin 3.7 2.0 - 4.0 g/dL    A-G Ratio 1.0 (L) 1.1 - 2.2     URINALYSIS W/MICROSCOPIC   Result Value Ref Range    Color YELLOW/STRAW      Appearance CLOUDY (A) CLEAR      Specific gravity 1.015 1.003 - 1.030      pH (UA) 6.0 5.0 - 8.0      Protein NEGATIVE  NEG mg/dL    Glucose NEGATIVE  NEG mg/dL    Ketone NEGATIVE  NEG mg/dL    Bilirubin NEGATIVE  NEG      Blood NEGATIVE  NEG      Urobilinogen 1.0 0.2 - 1.0 EU/dL    Nitrites NEGATIVE  NEG      Leukocyte Esterase NEGATIVE  NEG      WBC 0-4 0 - 4 /hpf    RBC 0-5 0 - 5 /hpf    Epithelial cells FEW FEW /lpf    Bacteria NEGATIVE  NEG /hpf    Hyaline cast 0-2 0 - 5 /lpf   TROPONIN I   Result Value Ref Range    Troponin-I, Qt. <0.04 <0.05 ng/mL   MAGNESIUM   Result Value Ref Range    Magnesium 2.2 1.6 - 2.4 mg/dL   HEMOGLOBIN A1C WITH EAG   Result Value Ref Range    Hemoglobin A1c 6.7 (H) 4.2 - 6.3 %    Est. average glucose 146 mg/dL   TSH 3RD GENERATION   Result Value Ref Range    TSH 1.06 0.36 - 3.74 uIU/mL   T3, FREE   Result Value Ref Range    Free Triiodothyronine 2.7 2.2 - 4.0 pg/mL   T4, FREE   Result Value Ref Range    T4, Free 1.0 0.8 - 1.5 NG/DL   LIPID PANEL   Result Value Ref Range    LIPID PROFILE          Cholesterol, total 150 <200 MG/DL    Triglyceride 96 <150 MG/DL    HDL Cholesterol 51 MG/DL    LDL, calculated 79.8 0 - 100 MG/DL    VLDL, calculated 19.2 MG/DL    CHOL/HDL Ratio 2.9 0 - 5.0     GLUCOSE, POC   Result Value Ref Range    Glucose (POC) 119 (H) 65 - 100 mg/dL    Performed by Adela Gerber (PCT)    GLUCOSE, POC   Result Value Ref Range    Glucose (POC) 105 (H) 65 - 100 mg/dL    Performed by Adela Gerber (PCT)    GLUCOSE, POC   Result Value Ref Range    Glucose (POC) 151 (H) 65 - 100 mg/dL    Performed by Brit Petty    GLUCOSE, POC   Result Value Ref Range    Glucose (POC) 140 (H) 65 - 100 mg/dL    Performed by Monica Hackett    GLUCOSE, POC   Result Value Ref Range    Glucose (POC) 132 (H) 65 - 100 mg/dL    Performed by Nohemi Stone (PCT)    EKG, 12 LEAD, INITIAL   Result Value Ref Range    Ventricular Rate 96 BPM    Atrial Rate 96 BPM    P-R Interval 170 ms    QRS Duration 94 ms    Q-T Interval 356 ms    QTC Calculation (Bezet) 449 ms    Calculated P Axis 59 degrees    Calculated R Axis 37 degrees    Calculated T Axis 17 degrees    Diagnosis       Normal sinus rhythm  T wave abnormality, consider inferior ischemia  Abnormal ECG  When compared with ECG of 02-APR-2010 11:49,  No significant change was found  Confirmed by Janae Metcalf MD., Asim ([id number]) on 8/1/2017 1:54:02 PM         PHYSICAL EXAM  Patient Vitals for the past 8 hrs:   Temp Pulse Resp BP SpO2   08/02/17 1948 99.1 °F (37.3 °C) 74 18 136/61 91 %   08/02/17 1559 98.6 °F (37 °C) 98 18 135/86 95 %       General:  Alert, cooperative, no distress. Head:  Normocephalic, without obvious abnormality, atraumatic. Eyes:  Conjunctivae/corneas clear. Pupils equal, round, reactive to light. Extraocular movements intact, questionable right visual field cut, NO papilledema   Lungs:  Heart:   Non labored breathing  Regular rate and rhythm, no carotid bruits   Abdomen:   Soft, non-distended   Extremities: Extremities normal, atraumatic, no cyanosis or edema. Pulses: 2+ and symmetric all extremities. Skin: Skin color, texture, turgor normal. No rashes or lesions. Neurologic:  Gen: Attention normal             Language: naming, repetition, fluency normal             Memory: intact recent and remote memory  Cranial Nerves:  I: smell Not tested   II: visual fields  questionable right visual field cut   II: pupils Equal, round, reactive to light   II: optic disc No papilledema   III,VII: ptosis none   III,IV,VI: extraocular muscles  Full ROM   V: mastication normal   V: facial light touch sensation  normal   VII: facial muscle function   symmetric   VIII: hearing symmetric   IX: soft palate elevation  normal   XI: trapezius strength  5/5   XI: sternocleidomastoid strength 5/5   XI: neck flexion strength  5/5   XII: tongue  midline     Motor: normal bulk and tone, no tremor              Strength: 5/5 on left, 5-/5 on right upper extremity, 5/5 in right lower extremity  Sensory: intact to LT, PP, vibration, and temperature  Coordination: FTN with dysmetria on the right  Gait: normal gait including tandem   Reflexes: 2+ throughout       IMPRESSION:  This is a 80-year-old female admitted after acute onset of right-sided weakness and dizziness with nausea. MRI did confirm a left occipital/temporal/thalamic infarct. Currently patient's symptoms are some minimal weakness on the right side and questionable field cut though this is difficult to tell due to language barrier. Stroke risk factors include hypertension and diabetes. Patient is appropriately risk modified for stroke. RECOMMENDATIONS:  1. MRI brain as above. CTA head/neck: No significant stenosis  2. TTE EF of 60%  3. Telemetry normal sinus rhythm  4. Good blood pressure control  5. Stroke labs (HgbA1c, TSH, lipid panel) as above  6. Start ASA 81mg daily. Patient was not on any of these medications prior to admission. Patient was placed on Plavix this admission, but since she has not failed aspirin, will change her to this. 7. Start statin. LDL goal should be less than 70  8. ST/OT/PT eval ordered and will assess patient for rehab  9. Discussed personal risk factors for stroke including: Hypertension and hyperlipidemia and diabetes  10. Discussed signs and symptoms of stroke and when to alert 911  11. VTE prophylaxis: Lovenox    Thank you very much for this consultation. No further neurological workup recommended. Patient should follow-up in the neurology clinic in 2-4 weeks as scheduled.   Will sign off the please call with further questions      Rena Sierra MD  August 2, 2017

## 2017-08-02 NOTE — PROGRESS NOTES
Problem: Self Care Deficits Care Plan (Adult)  Goal: *Acute Goals and Plan of Care (Insert Text)  Occupational Therapy Goals  Initiated 8/2/2017  1. Patient will perform self-feeding with independence within 7 day(s). 2. Patient will perform grooming with modified independence to supervision within 7 day(s). 3. Patient will perform lower body dressing with supervision/set-up within 7 day(s). 4. Patient will perform toilet transfers with supervision/set-up within 7 day(s). 5. Patient will perform all aspects of toileting with supervision/set-up within 7 day(s). 6. Patient will locate 5/5 items on right without cues within 7 day(s). OCCUPATIONAL THERAPY EVALUATION  Patient: Chana Wallace (82 y.o. female)  Date: 8/2/2017  Primary Diagnosis: Right sided weakness        Precautions:   Fall      ASSESSMENT :  Based on the objective data described below, the patient presents with only complaint of small headache on right side of her head. Patient's grandson present and served as  throughout. Patient with slightly decreased coordination on right UE and with decreased attention to right side noted during grooming at sink. Educated grandson on cues to provide. Overall CGA to min assist without adaptive equipment for ADL mobility, LE ADL's with min assist. Family to assist at home and feel once medically cleared home with home health OT recommended. Patient will benefit from skilled intervention to address the above impairments.   Patients rehabilitation potential is considered to be Fair  Factors which may influence rehabilitation potential include:   [ ]                None noted  [ ]                Mental ability/status  [ ]                Medical condition  [ ]                Home/family situation and support systems  [X]                Safety awareness  [ ]                Pain tolerance/management  [ ]                Other:        PLAN :  Recommendations and Planned Interventions:  [X] Self Care Training                  [X]           Therapeutic Activities  [X]                  Functional Mobility Training    [X]           Cognitive Retraining  [X]                  Therapeutic Exercises           [X]           Endurance Activities  [X]                  Balance Training                   [X]           Neuromuscular Re-Education  [X]                  Visual/Perceptual Training     [X]      Home Safety Training  [X]                  Patient Education                 [X]           Family Training/Education  [ ]                  Other (comment):     Frequency/Duration: Patient will be followed by occupational therapy 5 times a week to address goals. Discharge Recommendations: Home Health  Further Equipment Recommendations for Discharge: none       SUBJECTIVE:   Patient stated a little headache.  in English to her grandson      OBJECTIVE DATA SUMMARY:   HISTORY:   Past Medical History:   Diagnosis Date    Acute CVA (cerebrovascular accident) ([facility] [place] [address] 8/2/2017     L occipital lobe, Mid and posterior, L temporal lobe, and L thalamus    Diabetes ([facility] [place] [address]      Hypertension       Past Surgical History:   Procedure Laterality Date    HX OTHER SURGICAL         tonsils        Prior Level of Function/Home Situation: independent, visiting from [place], travelfoxs, no equipment for mobility  Expanded or extensive additional review of patient history:      Home Situation  Home Environment: Private residence  One/Two Story Residence: Two story  Living Alone: No  Support Systems: Family member(s), Child(cristina)  Patient Expects to be Discharged to[de-identified] Private residence  Current DME Used/Available at Home: None  [ ]  Right hand dominant   [ ]  Left hand dominant (grandson did not answer if right or left handed or translate to patient)     EXAMINATION OF PERFORMANCE DEFICITS:  Cognitive/Behavioral Status:  Neurologic State: Alert  Orientation Level: Oriented to person;Oriented to place  Cognition: Follows commands; Appropriate for age attention/concentration  Perception: Cues to attend right visual field (noted during grooming)  Perseveration: No perseveration noted  Safety/Judgement: Decreased awareness of need for assistance;Decreased insight into deficits     Skin: intact     Edema: none     Hearing: Auditory  Auditory Impairment: Hard of hearing, bilateral     Vision/Perceptual:                                Corrective Lenses: Glasses     Range of Motion:  AROM: Generally decreased, functional  PROM: Generally decreased, functional                       Strength:  Strength: Generally decreased, functional                 Coordination:  Coordination: Generally decreased, functional (right < left)  Fine Motor Skills-Upper: Right Impaired;Left Intact    Gross Motor Skills-Upper: Right Impaired;Left Intact     Tone & Sensation:  Tone: Normal  Sensation: Intact                       Balance:  Sitting: Intact; Without support  Standing: Impaired  Standing - Static: Good; Unsupported  Standing - Dynamic : Fair     Functional Mobility and Transfers for ADLs:  Bed Mobility:  Rolling: Independent  Supine to Sit: Contact guard assistance  Sit to Supine: Supervision  Scooting: Supervision     Transfers:  Sit to Stand: Contact guard assistance  Functional Transfers  Toilet Transfer : Minimum assistance     ADL Assessment:  Feeding: Modified independent     Oral Facial Hygiene/Grooming: Contact guard assistance; Other (comment) (noted standing left side of sink, cue to find soap on right)     Bathing: Minimum assistance     Upper Body Dressing: Setup     Lower Body Dressing: Minimum assistance; Other (comment) (instructed to cross LE's versus forward bend)     Toileting: Contact guard assistance;Minimum assistance; Other (comment) (located toliet paper on right without cues, decreased coord)                 ADL Intervention and task modifications:      educated on fall prevention and home safety, cues to provide if right inattention noted, safety awareness     Cognitive Retraining  Safety/Judgement: Decreased awareness of need for assistance;Decreased insight into deficits           Functional Measure:   Fugl-Owen Assessment of Motor Recovery after Stroke:       Reflex Activity  Flexors/Biceps/Fingers: Can be elicited  Extensors/Triceps: Can be elicited  Reflex Subtotal: 4     Volitional Movement Within Synergies  Shoulder Retraction: Full  Shoulder Elevation: Full  Shoulder Abduction (90 degrees): Full  Shoulder External Rotation: Full  Elbow Flexion: Full  Forearm Supination: Full  Shoulder Adduction/Internal Rotation: Full  Elbow Extension: Full  Forearm Pronation: Full  Subtotal: 18     Volitional Movement Mixing Synergies  Hand to Lumbar Spine: Full  Shoulder Flexion (0-90 degrees): Full  Pronation-Supination: Full  Subtotal: 6     Volitional Movement With Little or No Synergy  Shoulder Abduction (0-90 degrees): Full  Shoulder Flexion ( degrees): Full  Pronation/Supination: Full  Subtotal : 6     Normal Reflex Activity  Biceps, Triceps, Finger Flexors: Full  Subtotal : 2     Upper Extremity Total   Upper Extremity Total: 36     Wrist  Stability at 15 Degree Dorsiflexion: Full  Repeated Dorsiflexion/ Volar Flexion: Full  Stability at 15 Degree Dorsiflexion: Full  Repeated Dorsiflexion/ Volar Flexion: Full  Circumduction: Full  Wrist Total: 10     Hand  Mass Flexion: Full  Mass Extension: Full  Grasp A: Full  Grasp B: Full  Grasp C: Full  Grasp D: Full  Grasp E: Full  Hand Total: 14     Coordination/Speed  Tremor: Slight  Dysmetria: Slight  Time: 2-5s  Coordination/Speed Total : 3     Total A-D  Total A-D (Motor Function): 63/66      Percentage of impairment CH  0% CI  1-19% CJ  20-39% CK  40-59% CL  60-79% CM  80-99% CN  100%   Fugl-Owen score: 0-66 66 53-65 39-52 26-38 13-25 1-12    0      This is a reliable/valid measure of arm function after a neurological event.  It has established value to characterize functional status and for measuring spontaneous and therapy-induced recovery; tests proximal and distal motor functions. Fugl-Owen Assessment  UE scores recorded between five and 30 days post neurologic event can be used to predict UE recovery at six months post neurologic event. Severe = 0-21 points   Moderately Severe = 22-33 points   Moderate = 34-47 points   Mild = 48-66 points  ROSALINA Campos, FELIZ Arteaga, & SHIRA Paulino (1992). Measurement of motor recovery after stroke: Outcome assessment and sample size requirements. Stroke, 23, pp. 7501-2777.   ------------------------------------------------------------------------------------------------------------------------------------------------------------------  MCID:  Stroke:   Paul Tejeda et al, 2001; n = 171; mean age 79 (5) years; assessed within 16 (12) days of stroke, Acute Stroke)  FMA Motor Scores from Admission to Discharge   · 10 point increase in FMA Upper Extremity = 1.5 change in discharge FIM  · 10 point increase in FMA Lower Extremity = 1.9 change in discharge FIM  MDC:   Stroke:   Cyndee Walker et al, 2008, n = 14, mean age = 59.9 (14.6) years, assessed on average 14 (6.5) months post stroke, Chronic Stroke)   · FMA = 5.2 points for the Upper Extremity portion of the assessment      G codes: In compliance with CMSs Claims Based Outcome Reporting, the following G-code set was chosen for this patient based on their primary functional limitation being treated: The outcome measure chosen to determine the severity of the functional limitation was the [facility] with a score of 63/66 which was correlated with the impairment scale.       · Self Care:               - CURRENT STATUS:    CI - 1%-19% impaired, limited or restricted               - GOAL STATUS:           CH - 0% impaired, limited or restricted               - D/C STATUS:                       CH - 0% impaired, limited or restricted  ---------------To be determined--------------- Occupational Therapy Evaluation Charge Determination   History Examination Decision-Making   LOW Complexity : Brief history review  LOW Complexity : 1-3 performance deficits relating to physical, cognitive , or psychosocial skils that result in activity limitations and / or participation restrictions  LOW Complexity : No comorbidities that affect functional and no verbal or physical assistance needed to complete eval tasks       Based on the above components, the patient evaluation is determined to be of the following complexity level: LOW      Pain:  Pain Scale 1: Numeric (0 - 10)  Pain Intensity 1: 0      slight headache        Activity Tolerance:   Good   Please refer to the flowsheet for vital signs taken during this treatment. After treatment:   [ ]  Patient left in no apparent distress sitting up in chair  [X]  Patient left in no apparent distress in bed  [X]  Call bell left within reach  [X]  Nursing notified  [X]  Caregiver present  [ ]  Bed alarm activated      COMMUNICATION/EDUCATION:   The patients plan of care was discussed with: Physical Therapist and Registered Nurse. SLP     Patient was educated regarding Her deficit(s) of right inattention, decreased coordination and balance as this relates to Her diagnosis of CVA. She demonstrated Fair understanding. Limited by hard of hearing and decreased insight. Patient and/or family was verbally educated on the BE FAST acronym for signs/symptoms of CVA and TIA. Informed patient to refer to the Stroke Binder for further BE FAST information. All questions answered with patient indicating fair understanding. CVA booklet present in [place] and [facility] [place]     [X]    Home safety education was provided and the patient/caregiver indicated understanding. [X]    Patient/family have participated as able in goal setting and plan of care. [ ]    Patient/family agree to work toward stated goals and plan of care.   [ ]    Patient understands intent and goals of therapy, but is neutral about his/her participation. [ ]    Patient is unable to participate in goal setting and plan of care. This patients plan of care is appropriate for delegation to F[facility].      Thank you for this referral.  Rob Jacome, OTR/L  Time Calculation: 20 mins

## 2017-08-03 ENCOUNTER — TELEPHONE (OUTPATIENT)
Dept: NEUROLOGY | Age: 82
End: 2017-08-03

## 2017-08-03 VITALS
HEIGHT: 64 IN | TEMPERATURE: 98.1 F | DIASTOLIC BLOOD PRESSURE: 72 MMHG | RESPIRATION RATE: 16 BRPM | WEIGHT: 120 LBS | HEART RATE: 75 BPM | BODY MASS INDEX: 20.49 KG/M2 | OXYGEN SATURATION: 95 % | SYSTOLIC BLOOD PRESSURE: 150 MMHG

## 2017-08-03 LAB
ANION GAP BLD CALC-SCNC: 9 MMOL/L (ref 5–15)
BUN SERPL-MCNC: 11 MG/DL (ref 6–20)
BUN/CREAT SERPL: 24 (ref 12–20)
CALCIUM SERPL-MCNC: 8.7 MG/DL (ref 8.5–10.1)
CHLORIDE SERPL-SCNC: 107 MMOL/L (ref 97–108)
CO2 SERPL-SCNC: 25 MMOL/L (ref 21–32)
CREAT SERPL-MCNC: 0.46 MG/DL (ref 0.55–1.02)
ERYTHROCYTE [DISTWIDTH] IN BLOOD BY AUTOMATED COUNT: 12.8 % (ref 11.5–14.5)
GLUCOSE BLD STRIP.AUTO-MCNC: 121 MG/DL (ref 65–100)
GLUCOSE BLD STRIP.AUTO-MCNC: 128 MG/DL (ref 65–100)
GLUCOSE SERPL-MCNC: 121 MG/DL (ref 65–100)
HCT VFR BLD AUTO: 37.7 % (ref 35–47)
HGB BLD-MCNC: 12.2 G/DL (ref 11.5–16)
MCH RBC QN AUTO: 29.3 PG (ref 26–34)
MCHC RBC AUTO-ENTMCNC: 32.4 G/DL (ref 30–36.5)
MCV RBC AUTO: 90.6 FL (ref 80–99)
PLATELET # BLD AUTO: 213 K/UL (ref 150–400)
POTASSIUM SERPL-SCNC: 3.8 MMOL/L (ref 3.5–5.1)
RBC # BLD AUTO: 4.16 M/UL (ref 3.8–5.2)
SERVICE CMNT-IMP: ABNORMAL
SERVICE CMNT-IMP: ABNORMAL
SODIUM SERPL-SCNC: 141 MMOL/L (ref 136–145)
WBC # BLD AUTO: 7.4 K/UL (ref 3.6–11)

## 2017-08-03 PROCEDURE — 85027 COMPLETE CBC AUTOMATED: CPT | Performed by: INTERNAL MEDICINE

## 2017-08-03 PROCEDURE — 74011250637 HC RX REV CODE- 250/637: Performed by: INTERNAL MEDICINE

## 2017-08-03 PROCEDURE — 74011250637 HC RX REV CODE- 250/637: Performed by: PSYCHIATRY & NEUROLOGY

## 2017-08-03 PROCEDURE — 80048 BASIC METABOLIC PNL TOTAL CA: CPT | Performed by: INTERNAL MEDICINE

## 2017-08-03 PROCEDURE — 82962 GLUCOSE BLOOD TEST: CPT

## 2017-08-03 PROCEDURE — 36415 COLL VENOUS BLD VENIPUNCTURE: CPT | Performed by: INTERNAL MEDICINE

## 2017-08-03 RX ORDER — GUAIFENESIN 100 MG/5ML
81 LIQUID (ML) ORAL DAILY
Qty: 30 TAB | Refills: 0 | Status: SHIPPED | OUTPATIENT
Start: 2017-08-03 | End: 2017-10-05 | Stop reason: SDUPTHER

## 2017-08-03 RX ORDER — DIPHENOXYLATE HCL/ATROPINE 2.5-.025/5
5 LIQUID (ML) ORAL
Status: DISCONTINUED | OUTPATIENT
Start: 2017-08-03 | End: 2017-08-03 | Stop reason: HOSPADM

## 2017-08-03 RX ADMIN — CANDESARTAN CILEXETIL 32 MG: 8 TABLET ORAL at 09:43

## 2017-08-03 RX ADMIN — DIPHENOXYLATE HYDROCHLORIDE AND ATROPINE SULFATE 5 ML: 2.5; .025 SOLUTION ORAL at 11:55

## 2017-08-03 RX ADMIN — ASPIRIN 81 MG 81 MG: 81 TABLET ORAL at 09:43

## 2017-08-03 RX ADMIN — Medication 10 ML: at 06:52

## 2017-08-03 RX ADMIN — FAMOTIDINE 20 MG: 20 TABLET, FILM COATED ORAL at 09:43

## 2017-08-03 NOTE — PROGRESS NOTES
Discharge instructions reviewed with Taj Fritz. Patients son who verbalized understanding. IV removed. Signed discharge papers placed in patients chart. He was asking about a rolling walker, as they do not have one at home. Will get in touch with Cindy Hobbs before officially discharging patient.

## 2017-08-03 NOTE — PROGRESS NOTES
8/3/2017 1:30 PM Orders for HH PT, OT, RN and SW received. Discussed with the pt's grandson, Sheri Cardona. Agreeable to [address] Referral sent via [facility]. PCP appointment made with Dr. Flex Mendez on tomorrow 8/4/17 at 3:30PM. No new needs. KEYUR Hoyt     8/3/2017 10:25 AM Orders received for home health. Spoke with RN staff. Requested also SW and RN for home care.   KEYUR Hoyt

## 2017-08-03 NOTE — PROGRESS NOTES
Bedside and Verbal shift change report given to Emy Santos RN (oncoming nurse) by Deanne Avila (offgoing nurse). Report included the following information SBAR, Kardex, Procedure Summary, Intake/Output, MAR, Recent Results and Med Rec Status.

## 2017-08-03 NOTE — TELEPHONE ENCOUNTER
Amanda Nieves called from [facility].  The patient doesn't have insurance and Dr. Eugene Gorman saw her in the [place] didn't know if the Dr. Eguene Gorman could sign home health orders since she saw her In the hospital.

## 2017-08-03 NOTE — PROGRESS NOTES
Bedside and Verbal shift change report given to Hansel Zuniga (oncoming nurse) by Wellington Wilkerson (offgoing nurse). Report included the following information SBAR, Kardex, Intake/Output, MAR and Recent Results.

## 2017-08-04 ENCOUNTER — HOME HEALTH ADMISSION (OUTPATIENT)
Dept: HOME HEALTH SERVICES | Facility: HOME HEALTH | Age: 82
End: 2017-08-04
Payer: COMMERCIAL

## 2017-08-04 ENCOUNTER — OFFICE VISIT (OUTPATIENT)
Dept: FAMILY MEDICINE CLINIC | Age: 82
End: 2017-08-04

## 2017-08-04 VITALS
HEART RATE: 67 BPM | WEIGHT: 140 LBS | TEMPERATURE: 99.2 F | SYSTOLIC BLOOD PRESSURE: 152 MMHG | DIASTOLIC BLOOD PRESSURE: 73 MMHG | HEIGHT: 64 IN | OXYGEN SATURATION: 95 % | RESPIRATION RATE: 18 BRPM | BODY MASS INDEX: 23.9 KG/M2

## 2017-08-04 DIAGNOSIS — I10 HTN (HYPERTENSION), BENIGN: ICD-10-CM

## 2017-08-04 DIAGNOSIS — Z09 HOSPITAL DISCHARGE FOLLOW-UP: Primary | ICD-10-CM

## 2017-08-04 DIAGNOSIS — R53.1 RIGHT SIDED WEAKNESS: ICD-10-CM

## 2017-08-04 DIAGNOSIS — E11.9 TYPE 2 DIABETES MELLITUS WITHOUT COMPLICATION, WITHOUT LONG-TERM CURRENT USE OF INSULIN (HCC): ICD-10-CM

## 2017-08-04 NOTE — PROGRESS NOTES
[address]  [phone]             Date of visit: 8/4/2017   Subjective:      History obtained from:   the patient and the grandchild. Tigist Pearce is a 80 y.o. female who presents today for transitional care. she was discharged from [facility] facility on 8/3/2017. Post-discharge telephone contact was made within 2 business days by our nurse navigator. I have done her medication reconciliation. As per Dr. Joaquin Mcginnis hospitalist the patient was admitted for the right sided weakness and was found to have acute CVA. Since discharge, patient doing a little better, still having some weakness in the right arm. R sided weakness 2/2 to acute CVA. The symptoms are slowly improving. Still having some weakness In the right arm. No dysarthria. No numbness or tingling in the extremities. The pt is ambulating with the walker at home. Hypertension. The reports checking BP at home but does not remember the numbers. Compliant with her medication of candesartan. No chest pain. No SOB. No headaches. Diabetes mellitus type 2. Well controlled with Amaryl. Compliant with the medication. Following diabetic diet. No numbness or tingling in the lower extremities. Patient Active Problem List    Diagnosis Date Noted    Right sided weakness 08/01/2017    HTN (hypertension), benign 08/01/2017    Type 2 diabetes mellitus without complication ([facility] [address] 84/13/3767    Generalized headaches 08/01/2017    Acute CVA (cerebrovascular accident) ([facility] 08/01/2017     Current Outpatient Prescriptions   Medication Sig Dispense Refill    aspirin 81 mg chewable tablet Take 1 Tab by mouth daily. 30 Tab 0    atorvastatin (LIPITOR) 10 mg tablet Take 1 Tab by mouth nightly. Indications: prevention of cerebrovascular accident 30 Tab 0    candesartan (ATACAND) 32 mg tablet Take 32 mg by mouth daily.       glimepiride (AMARYL) 2 mg tablet Take 2 mg by mouth every morning. No Known Allergies  Past Medical History:   Diagnosis Date    Acute CVA (cerebrovascular accident) ([facility] [address] 8/2/2017    L occipital lobe, Mid and posterior, L temporal lobe, and L thalamus    Diabetes ([facility] [address]     Hypertension      Past Surgical History:   Procedure Laterality Date    HX OTHER SURGICAL      tonsils     History reviewed. No pertinent family history. Social History   Substance Use Topics    Smoking status: Never Smoker    Smokeless tobacco: Never Used    Alcohol use No      Social History     Social History Narrative        Review of Systems  General/Constitutional:   No headache, fever, fatigue, weight loss or weight gain       Eyes:   No redness, pruritis, pain, visual changes, swelling, or discharge      Ears:    No pain, loss or changes in hearing     Neck:   No swelling, masses, stiffness, pain, or limited movement     Cardiac:    No chest pain      Respiratory:   No cough or shortness of breath     GI:   No nausea/vomiting, diarrhea, abdominal pain, bloody or dark stools       :   No dysuria or  hematuria    Neurological:   No loss of consciousness, dizziness, seizures, dysarthria. Weakness in the right arm. Skin: No rash      Objective:     Vitals:    08/04/17 1627   BP: 152/73   Pulse: 67   Resp: 18   Temp: 99.2 °F (37.3 °C)   TempSrc: Oral   SpO2: 95%   Weight: 140 lb (63.5 kg)   Height: 5' 4\" (1.626 m)     Body mass index is 24.03 kg/(m^2). Physical Examination:   GEN: No apparent distress. Alert and oriented and responds to all questions appropriately. EYES:  Conjunctiva clear; pupils round and reactive to light; extraocular movements are intact. Heddie Burlington   NECK:  Supple; no masses; thyroid normal           LUNGS: Respirations unlabored; clear to auscultation bilaterally  CARDIOVASCULAR: Regular, rate, and rhythm without murmurs, gallops or rubs   ABDOMEN: Soft; nontender; nondistended; normoactive bowel sounds; no masses or organomegaly  NEUROLOGIC: Face symmetric, tongue midline, speech fluent, CNs 3-12 in tact. Follows commands appropriately. Strength: 4/5  RUE and RLE, 5/5 LUE and LLE Sensation grossly intact. EXT: Well perfused. No edema. SKIN: No obvious rashes. Lab Results   Component Value Date/Time    Hemoglobin A1c 6.7 08/01/2017 01:28 PM     Lab Results   Component Value Date/Time    Cholesterol, total 150 08/02/2017 01:44 AM    HDL Cholesterol 51 08/02/2017 01:44 AM    LDL, calculated 79.8 08/02/2017 01:44 AM    VLDL, calculated 19.2 08/02/2017 01:44 AM    Triglyceride 96 08/02/2017 01:44 AM    CHOL/HDL Ratio 2.9 08/02/2017 01:44 AM     Lab Results   Component Value Date/Time    Sodium 141 08/03/2017 01:58 AM    Potassium 3.8 08/03/2017 01:58 AM    Chloride 107 08/03/2017 01:58 AM    CO2 25 08/03/2017 01:58 AM    Anion gap 9 08/03/2017 01:58 AM    Glucose 121 08/03/2017 01:58 AM    BUN 11 08/03/2017 01:58 AM    Creatinine 0.46 08/03/2017 01:58 AM    BUN/Creatinine ratio 24 08/03/2017 01:58 AM    GFR est AA >60 08/03/2017 01:58 AM    GFR est non-AA >60 08/03/2017 01:58 AM    Calcium 8.7 08/03/2017 01:58 AM    Bilirubin, total 0.7 08/01/2017 01:28 PM    AST (SGOT) 17 08/01/2017 01:28 PM    Alk. phosphatase 72 08/01/2017 01:28 PM    Protein, total 7.5 08/01/2017 01:28 PM    Albumin 3.8 08/01/2017 01:28 PM    Globulin 3.7 08/01/2017 01:28 PM    A-G Ratio 1.0 08/01/2017 01:28 PM    ALT (SGPT) 19 08/01/2017 01:28 PM     Lab Results   Component Value Date/Time    WBC 7.4 08/03/2017 01:58 AM    HGB 12.2 08/03/2017 01:58 AM    HCT 37.7 08/03/2017 01:58 AM    PLATELET 228 85/63/2736 01:58 AM    MCV 90.6 08/03/2017 01:58 AM     Lab Results   Component Value Date/Time    TSH 1.06 08/01/2017 01:28 PM    T4, Free 1.0 08/01/2017 01:28 PM     No results found for: Mayco BRITO [id number]      Assessment/Plan:       ICD-10-CM ICD-9-CM    1. Type 2 diabetes mellitus without complication, without long-term current use of insulin (HCC) E11.9 250.00    2.  HTN (hypertension), benign I10 401.1    3. Right sided weakness R53.1 728.87        Right sided weakness 2/2 acute CVA: Continue current medications and follow up with neurologist. The pt will start physical therapy next week. Hypertension: BP slightly elvated form goal. Advised to check blood pressure every day for the next 2 weeks and bring the log   Diabetes: Well controlled. HgA1C at goal for age. Will do foot exam and microalbumin next visit     Discussed the diagnosis and plan and she expressed understanding. Follow-up Disposition:  Return in about 2 weeks (around 8/18/2017) for Blood pressure check.     Willian Lira MD

## 2017-08-04 NOTE — TELEPHONE ENCOUNTER
Spoke with [employer] @ [address] [place] [address] [address] and she informed me the patient's PCP has handled orders.

## 2017-08-04 NOTE — PROGRESS NOTES
Chief Complaint   Patient presents with   [address]     from [facility] for stroke     1. Have you been to the ER, urgent care clinic since your last visit? Hospitalized since your last visit? Yes When: 8/1/17,,Stroke    2. Have you seen or consulted any other health care providers outside of the [address] [place] [address] since your last visit? Include any pap smears or colon screening.  No

## 2017-08-04 NOTE — MR AVS SNAPSHOT
Visit Information Areta Jay y Arianne Personal Médico Departamento Teléfono del Dep. Número de visita 8/4/2017  4:00 PM Karyna Bright, Sammy5 [facility] [phone] Follow-up Instructions Return in about 2 weeks (around 8/18/2017) for Blood pressure check. Your Appointments 8/25/2017 11:00 AM  
Any with Javid Dempsey NP 1991 [facility] ([facility]) Appt Note: hospital f/up stroke 08/02/17 hb  
 [place] [address] [address] [phone]  
  
   
 [place] [address] [id number] [address] Upcoming Health Maintenance Date Due  
 FOOT EXAM Q1 11/3/1936 MICROALBUMIN Q1 11/3/1936 EYE EXAM RETINAL OR DILATED Q1 11/3/1936 DTaP/Tdap/Td series (1 - Tdap) 11/3/1947 ZOSTER VACCINE AGE 60> 9/3/1986 GLAUCOMA SCREENING Q2Y 11/3/1991 OSTEOPOROSIS SCREENING (DEXA) 11/3/1991 Pneumococcal 65+ Low/Medium Risk (1 of 2 - PCV13) 11/3/1991 MEDICARE YEARLY EXAM 11/3/1991 INFLUENZA AGE 9 TO ADULT 8/1/2017 HEMOGLOBIN A1C Q6M 2/1/2018 LIPID PANEL Q1 8/2/2018 Alergias  Review Complete El: 8/4/2017 Por: Sharon Funez LPN A partir del:  8/4/2017 No Known Allergies Vacunas actuales Amedeo Brochure No hay ninguna vacuna archivada. No revisadas esta visita You Were Diagnosed With   
  
 Kerline Meo Type 2 diabetes mellitus without complication, without long-term current use of insulin (HCC)    -  Primary ICD-10-CM: E11.9 ICD-9-CM: 250.00 HTN (hypertension), benign     ICD-10-CM: I10 
ICD-9-CM: 401.1 Partes vitales PS Pulso Temperatura Resp [place] ( percentil de crecimiento) Peso (percentil de crecimiento) 152/73 (BP 1 Location: Left arm, BP Patient Position: Sitting) 67 99.2 °F (37.3 °C) (Oral) 18 5' 4\" (1.626 m) 140 lb (63.5 kg) SpO2 BMI (IMC) Estado obstétrico Estatus de tabaquísmo 95% 24.03 kg/m2 Postmenopausal Never Smoker Historial de signos vitales BMI and BSA Data Body Mass Index Body Surface Area 24.03 kg/m 2 1.69 m 2 Deshpande lista de medicamentos actualizada Lista actualizada el: 8/4/17  4:54 PM.  Rafael Edinson use deshpande lista de medicamentos más reciente. aspirin 81 mg chewable tablet Take 1 Tab by mouth daily. atorvastatin 10 mg tablet También conocido jaswinder:  LIPITOR Take 1 Tab by mouth nightly. Indications: prevention of cerebrovascular accident  
  
 candesartan 32 mg tablet También conocido jaswinder:  ATACAND Take 32 mg by mouth daily. glimepiride 2 mg tablet También conocido jaswinder:  AMARYL Take 2 mg by mouth every morning. Instrucciones de seguimiento Return in about 2 weeks (around 8/18/2017) for Blood pressure check. Instrucciones para el Paciente Learning About How to Prevent Another Stroke What can you do to prevent another stroke? After a stroke, people feel lots of different emotions. Some people are worried that they could have another stroke. Or they may feel overwhelmed by how much there is to learn and do. Some people feel sad or depressed. No matter what emotions you are feeling, you can give yourself some control and peace of mind by making a plan to lower your risk of having another stroke. Take your medicines You'll need to take medicines to help prevent another stroke. Be sure to take your medicines exactly as prescribed. And don't stop taking them unless your doctor tells you to. If you stop taking your medicines, you can increase your risk of having another stroke. Some of the medicines your doctor may prescribe include: · Aspirin or some other blood thinner to prevent blood clots. · Statins to lower cholesterol. · Blood pressure medicines to lower blood pressure. Manage other health problems You can help lower your chance of having another stroke by managing certain other health problems. Problems that increase your risk of having another stroke include: · High blood pressure. · High cholesterol. · Atrial fibrillation. · Diabetes. If you have any of these health problems, you can manage them with healthy lifestyle changes along with medicine. Adopt a healthy lifestyle · Do not smoke or allow others to smoke around you. If you need help quitting, talk to your doctor about stop-smoking programs and medicines. These can increase your chances of quitting for good. Smoking makes a stroke more likely. · Limit alcohol to 2 drinks a day for men and 1 drink a day for women. · Lose weight if you need to. Controlling your weight will help you keep your heart and body healthy. · Be active. Ask your doctor what type and level of activity is safe for you. · Eat heart-healthy foods, like fruits, vegetables, and high-fiber foods. It's also important to: · Get a flu shot every year. · Ask for help if you think you are depressed. Do stroke rehab Taking part in a stroke rehabilitation (rehab) program will help you to regain skills you lost or make the most of your abilities after a stroke. It also helps you take steps to prevent another stroke. Your rehab team will give you education and support to help you build new, healthy habits. You'll learn how to manage any other health problems that you might have. Garen Boas also learn how to exercise safely, eat a healthy diet, and quit smoking if you smoke. Garen Boas work with your team to decide what lifestyle choices are best for you. If your doctor hasn't already suggested it, ask him or her if stroke rehab is right for you. Know stroke symptoms Make sure you know the symptoms of stroke. FAST is a simple way to remember. Recognizing these symptoms helps you know when to call for medical help. FAST stands for: 
· Face drooping. · Arm weakness. · Speech difficulty. · Time to call 911. Follow-up care is a key part of your treatment and safety. Be sure to make and go to all appointments, and call your doctor if you are having problems. It's also a good idea to know your test results and keep a list of the medicines you take. Where can you learn more? Go to http://bernardo-desmond.info/. Enter N686 in the search box to learn more about \"Learning About How to Prevent Another Stroke. \" Current as of: March 20, 2017 Content Version: 11.3 © 3421-4297 [facility]. Care instructions adapted under license by [employer] (which disclaims liability or warranty for this information). If you have questions about a medical condition or this instruction, always ask your healthcare professional. [address] any warranty or liability for your use of this information. Introducing [facility] & HEALTH SERVICES! [facility] introduce portal paciente [employer] . Ahora se puede acceder a partes de deshpande expediente médico, enviar por correo electrónico la oficina de deshpande médico y solicitar renovaciones de medicamentos en línea. En deshpande navegador de Internet , Tania Kamara a [email or web address]. Get [employer] It Sales/[facility] Moris clic en el usuario por George Patel? Samantha Rojase clic aquí en la sesión Coral White. Verá la página de registro [place]. Ingrese deshpande código de [facility] of Rosie anais y jaswinder aparece a continuación. Usted no tendrá que [employer] código después de elver completado el proceso de registro . Si usted no se inscribe antes de la fecha de caducidad , debe solicitar un nuevo código. · MyChart Código de acceso : [id number] Expires: 10/31/2017  1:42 PM 
 
Ingresa los últimos cuatro dígitos de deshpande Número de Seguro Social ( xxxx ) y fecha de nacimiento ( dd / mm / aaaa ) jaswinder se indica y moris clic en Enviar. Usted será llevado a la siguiente página de registro . Crear un ID MyChart . Esta será deshpande ID de inicio de sesión de MyChart y no puede ser [place] , por lo que pensar en yvonne que es [place] y fácil de recordar . Crear yvonne contraseña MyChart . Usted puede cambiar deshpande contraseña en cualquier momento . Ingrese deshpande Password Reset de preguntas y [place] . [place] se puede utilizar en un momento posterior si usted olvida deshpande contraseña. Introduzca deshpande dirección de correo electrónico . Kelin Fabry recibirá yvonne notificación por correo electrónico cuando la nueva información está disponible en MyChart . Licha Hensley clic en Registrarse. Corena Aguilar ash y descargar porciones de deshpande expediente médico. 
George clic en el enlace de descarga del menú Resumen para descargar yvonne copia portátil de deshpande información médica . Si tiene [employer] , por favor visite la sección de preguntas frecuentes del sitio web MyChart . Recuerde, MyChart NO es que se utilizará para las necesidades urgentes. Para emergencias médicas , llame al 911 . Ahora disponible en deshpande iPhone y Android ! Por favor proporcione jonn resumen de la documentación de cuidado a deshpande próximo proveedor. Your primary care clinician is listed as [facility] Mariano. If you have any questions after today's visit, please call [phone].

## 2017-08-04 NOTE — PATIENT INSTRUCTIONS
Learning About How to Prevent Another Stroke  What can you do to prevent another stroke? After a stroke, people feel lots of different emotions. Some people are worried that they could have another stroke. Or they may feel overwhelmed by how much there is to learn and do. Some people feel sad or depressed. No matter what emotions you are feeling, you can give yourself some control and peace of mind by making a plan to lower your risk of having another stroke. Take your medicines  You'll need to take medicines to help prevent another stroke. Be sure to take your medicines exactly as prescribed. And don't stop taking them unless your doctor tells you to. If you stop taking your medicines, you can increase your risk of having another stroke. Some of the medicines your doctor may prescribe include:  · Aspirin or some other blood thinner to prevent blood clots. · Statins to lower cholesterol. · Blood pressure medicines to lower blood pressure. Manage other health problems  You can help lower your chance of having another stroke by managing certain other health problems. Problems that increase your risk of having another stroke include:  · High blood pressure. · High cholesterol. · Atrial fibrillation. · Diabetes. If you have any of these health problems, you can manage them with healthy lifestyle changes along with medicine. Adopt a healthy lifestyle  · Do not smoke or allow others to smoke around you. If you need help quitting, talk to your doctor about stop-smoking programs and medicines. These can increase your chances of quitting for good. Smoking makes a stroke more likely. · Limit alcohol to 2 drinks a day for men and 1 drink a day for women. · Lose weight if you need to. Controlling your weight will help you keep your heart and body healthy. · Be active. Ask your doctor what type and level of activity is safe for you.   · Eat heart-healthy foods, like fruits, vegetables, and high-fiber foods.  It's also important to:  · Get a flu shot every year. · Ask for help if you think you are depressed. Do stroke rehab  Taking part in a stroke rehabilitation (rehab) program will help you to regain skills you lost or make the most of your abilities after a stroke. It also helps you take steps to prevent another stroke. Your rehab team will give you education and support to help you build new, healthy habits. You'll learn how to manage any other health problems that you might have. Cara Reaves also learn how to exercise safely, eat a healthy diet, and quit smoking if you smoke. Cara Reaves work with your team to decide what lifestyle choices are best for you. If your doctor hasn't already suggested it, ask him or her if stroke rehab is right for you. Know stroke symptoms  Make sure you know the symptoms of stroke. FAST is a simple way to remember. Recognizing these symptoms helps you know when to call for medical help. FAST stands for:  · Face drooping. · Arm weakness. · Speech difficulty. · Time to call 911. Follow-up care is a key part of your treatment and safety. Be sure to make and go to all appointments, and call your doctor if you are having problems. It's also a good idea to know your test results and keep a list of the medicines you take. Where can you learn more? Go to http://bernardo-desmond.info/. Enter D876 in the search box to learn more about \"Learning About How to Prevent Another Stroke. \"  Current as of: March 20, 2017  Content Version: 11.3  © 9797-8362 [employer]. Care instructions adapted under license by [employer] (which disclaims liability or warranty for this information). If you have questions about a medical condition or this instruction, always ask your healthcare professional. [address] any warranty or liability for your use of this information.

## 2017-08-07 ENCOUNTER — HOME CARE VISIT (OUTPATIENT)
Dept: SCHEDULING | Facility: HOME HEALTH | Age: 82
End: 2017-08-07
Payer: COMMERCIAL

## 2017-08-07 VITALS
SYSTOLIC BLOOD PRESSURE: 132 MMHG | RESPIRATION RATE: 18 BRPM | HEART RATE: 70 BPM | OXYGEN SATURATION: 98 % | DIASTOLIC BLOOD PRESSURE: 78 MMHG | TEMPERATURE: 99.2 F

## 2017-08-07 PROCEDURE — G0299 HHS/HOSPICE OF RN EA 15 MIN: HCPCS

## 2017-08-08 ENCOUNTER — TELEPHONE (OUTPATIENT)
Dept: FAMILY MEDICINE CLINIC | Age: 82
End: 2017-08-08

## 2017-08-08 ENCOUNTER — HOME CARE VISIT (OUTPATIENT)
Dept: SCHEDULING | Facility: HOME HEALTH | Age: 82
End: 2017-08-08
Payer: COMMERCIAL

## 2017-08-08 PROCEDURE — G0151 HHCP-SERV OF PT,EA 15 MIN: HCPCS

## 2017-08-08 NOTE — TELEPHONE ENCOUNTER
Patients grandson Elizabet Martinez who is care giver of patient stated DMV will not give handicap tag to her due to the information on DMV form needs to be his information not hers since he is the one driving the patient around. Please see me when you get into the office today. Leanor Buerger have the form with me to explain further more to you. I am at the . Thank you!

## 2017-08-09 ENCOUNTER — HOME CARE VISIT (OUTPATIENT)
Dept: SCHEDULING | Facility: HOME HEALTH | Age: 82
End: 2017-08-09
Payer: COMMERCIAL

## 2017-08-09 VITALS
SYSTOLIC BLOOD PRESSURE: 130 MMHG | HEART RATE: 72 BPM | TEMPERATURE: 98.4 F | DIASTOLIC BLOOD PRESSURE: 70 MMHG | OXYGEN SATURATION: 97 %

## 2017-08-09 VITALS
SYSTOLIC BLOOD PRESSURE: 142 MMHG | HEART RATE: 71 BPM | TEMPERATURE: 99.2 F | RESPIRATION RATE: 18 BRPM | DIASTOLIC BLOOD PRESSURE: 82 MMHG | OXYGEN SATURATION: 97 %

## 2017-08-09 PROCEDURE — G0152 HHCP-SERV OF OT,EA 15 MIN: HCPCS

## 2017-08-10 ENCOUNTER — HOME CARE VISIT (OUTPATIENT)
Dept: SCHEDULING | Facility: HOME HEALTH | Age: 82
End: 2017-08-10
Payer: COMMERCIAL

## 2017-08-10 VITALS
TEMPERATURE: 98.8 F | RESPIRATION RATE: 18 BRPM | HEART RATE: 68 BPM | SYSTOLIC BLOOD PRESSURE: 152 MMHG | DIASTOLIC BLOOD PRESSURE: 64 MMHG | OXYGEN SATURATION: 98 %

## 2017-08-10 PROCEDURE — G0151 HHCP-SERV OF PT,EA 15 MIN: HCPCS

## 2017-08-15 ENCOUNTER — HOME CARE VISIT (OUTPATIENT)
Dept: SCHEDULING | Facility: HOME HEALTH | Age: 82
End: 2017-08-15
Payer: COMMERCIAL

## 2017-08-15 PROCEDURE — G0151 HHCP-SERV OF PT,EA 15 MIN: HCPCS

## 2017-08-15 PROCEDURE — G0300 HHS/HOSPICE OF LPN EA 15 MIN: HCPCS

## 2017-08-15 PROCEDURE — G0152 HHCP-SERV OF OT,EA 15 MIN: HCPCS

## 2017-08-16 VITALS
TEMPERATURE: 98.9 F | OXYGEN SATURATION: 97 % | DIASTOLIC BLOOD PRESSURE: 72 MMHG | HEART RATE: 87 BPM | RESPIRATION RATE: 16 BRPM | SYSTOLIC BLOOD PRESSURE: 150 MMHG

## 2017-08-17 ENCOUNTER — HOME CARE VISIT (OUTPATIENT)
Dept: SCHEDULING | Facility: HOME HEALTH | Age: 82
End: 2017-08-17
Payer: COMMERCIAL

## 2017-08-17 VITALS
HEART RATE: 87 BPM | RESPIRATION RATE: 18 BRPM | TEMPERATURE: 98.7 F | DIASTOLIC BLOOD PRESSURE: 72 MMHG | SYSTOLIC BLOOD PRESSURE: 150 MMHG | OXYGEN SATURATION: 97 %

## 2017-08-17 VITALS
DIASTOLIC BLOOD PRESSURE: 78 MMHG | HEART RATE: 78 BPM | OXYGEN SATURATION: 97 % | TEMPERATURE: 97.8 F | SYSTOLIC BLOOD PRESSURE: 130 MMHG

## 2017-08-17 PROCEDURE — G0155 HHCP-SVS OF CSW,EA 15 MIN: HCPCS

## 2017-08-17 PROCEDURE — G0152 HHCP-SERV OF OT,EA 15 MIN: HCPCS

## 2017-08-17 PROCEDURE — G0300 HHS/HOSPICE OF LPN EA 15 MIN: HCPCS

## 2017-08-18 ENCOUNTER — OFFICE VISIT (OUTPATIENT)
Dept: FAMILY MEDICINE CLINIC | Age: 82
End: 2017-08-18

## 2017-08-18 VITALS
HEIGHT: 64 IN | RESPIRATION RATE: 20 BRPM | DIASTOLIC BLOOD PRESSURE: 70 MMHG | HEART RATE: 88 BPM | SYSTOLIC BLOOD PRESSURE: 150 MMHG | BODY MASS INDEX: 24.41 KG/M2 | TEMPERATURE: 97.8 F | WEIGHT: 143 LBS | OXYGEN SATURATION: 96 %

## 2017-08-18 VITALS — SYSTOLIC BLOOD PRESSURE: 140 MMHG | HEART RATE: 73 BPM | DIASTOLIC BLOOD PRESSURE: 60 MMHG | OXYGEN SATURATION: 89 %

## 2017-08-18 DIAGNOSIS — I10 ESSENTIAL HYPERTENSION: Primary | ICD-10-CM

## 2017-08-18 NOTE — MR AVS SNAPSHOT
Visit Information Akash Morochochersree Personal Médico Departamento Teléfono del Dep. Número de visita 8/18/2017 11:15 AM [place] DO Pete [address] [facility] [phone] Your Appointments 8/25/2017 11:00 AM  
Any with Kaela Osborne NP 1991 [facility] ([facility]) Appt Note: hospital f/up stroke 08/02/17 hb  
 [place] [address] [facility] [address] [facility] [address] [address] [phone]  
  
   
 [place] [phone] [id number] [address] Upcoming Health Maintenance Date Due  
 FOOT EXAM Q1 11/3/1936 MICROALBUMIN Q1 11/3/1936 EYE EXAM RETINAL OR DILATED Q1 11/3/1936 DTaP/Tdap/Td series (1 - Tdap) 11/3/1947 ZOSTER VACCINE AGE 60> 9/3/1986 GLAUCOMA SCREENING Q2Y 11/3/1991 OSTEOPOROSIS SCREENING (DEXA) 11/3/1991 Pneumococcal 65+ Low/Medium Risk (1 of 2 - PCV13) 11/3/1991 MEDICARE YEARLY EXAM 11/3/1991 INFLUENZA AGE 9 TO ADULT 8/1/2017 HEMOGLOBIN A1C Q6M 2/1/2018 LIPID PANEL Q1 8/2/2018 Alergias  Review Complete El: 8/18/2017 Por: Joleen Basurto LPN A partir del:  8/18/2017 No Known Allergies Vacunas actuales [place] No hay ninguna vacuna archivada. No revisadas esta visita You Were Diagnosed With   
  
 Gustavo Lee Essential hypertension    -  Primary ICD-10-CM: I10 
ICD-9-CM: 401.9 Partes vitales PS Pulso Temperatura Resp [place] ( percentil de crecimiento) Peso (percentil de crecimiento) 150/70 (BP 1 Location: Right arm, BP Patient Position: Sitting) 88 97.8 °F (36.6 °C) (Oral) 20 5' 4\" (1.626 m) 143 lb (64.9 kg) SpO2 BMI (IMC) Estado obstétrico Estatus de tabaquísmo 96% 24.55 kg/m2 Postmenopausal Never Smoker Historial de signos vitales BMI and BSA Data Body Mass Index Body Surface Area 24.55 kg/m 2 1.71 m 2 Marie lista de medicamentos actualizada Lista actualizada el: 8/18/17 12:22 PM.  John Gasmen use deshpande lista de medicamentos más reciente. acetaminophen 325 mg tablet También conocido jaswinder:  TYLENOL Take 650 mg by mouth every six (6) hours as needed for Pain. aspirin 81 mg chewable tablet Take 1 Tab by mouth daily. atorvastatin 10 mg tablet También conocido jaswinder:  LIPITOR Take 1 Tab by mouth nightly. Indications: prevention of cerebrovascular accident  
  
 candesartan 32 mg tablet También conocido jaswinder:  ATACAND Take 32 mg by mouth daily. glimepiride 2 mg tablet También conocido jaswinder:  AMARYL Take 2 mg by mouth every morning. Por hacer 08/21/2017 To Be Determined Appointment with Kristen Barr at [place] [address]  
  
 08/22/2017 11:15 AM  
  Appointment with Bishnu Peterson at [place] [address]  
  
 08/24/2017 To Be Determined Appointment with Roseanne Ventura RN at [place] [address]  
  
 08/24/2017 To Be Determined Appointment with Roseanne Ventura RN at [place] [address]  
  
 08/24/2017 12:00 PM  
  Appointment with Bishnu Peterson at [place] [address]  
  
 08/29/2017 To Be Determined Appointment with Bishnu Peterson at [place] [address] Introducing [facility] SERVICES! Burt Fletcher introduce portal paciente MyChart . Ahora se puede acceder a partes de deshpande expediente médico, enviar por correo electrónico la oficina de deshpande médico y solicitar renovaciones de medicamentos en línea. En deshpande navegador de Internet , Alane Dee Dee a [email or web address] George clic en el usuario por Di Castillo? Dewanda Keen clic aquí en la sesión [facility]. Verá la página de registro [place]. Ingrese deshpande código de [employer] anais y jaswinder aparece a continuación.  Usted no tendrá que [employer] código después de elver completado el proceso de registro . Si usted no se inscribe antes de la fecha de caducidad , debe solicitar un nuevo código. · MyChart Código de acceso : [id number] Expires: 10/31/2017  1:42 PM 
 
Ingresa los últimos cuatro dígitos de deshpande Número de Seguro Social ( xxxx ) y fecha de nacimiento ( dd / mm / aaaa ) jaswinder se indica y george clic en Enviar. Usted será llevado a la siguiente página de registro . Crear un ID MyChart . Esta será deshpande ID de inicio de sesión de MyChart y no puede ser [place] , por lo que pensar en yvonne que es Terrial Piper y fácil de recordar . Crear yvonne contraseña MyChart . Usted puede cambiar deshpande contraseña en cualquier momento . Ingrese deshpande Password Reset de preguntas y [place] . [place] se puede utilizar en un momento posterior si usted olvida deshpande contraseña. Introduzca deshpande dirección de correo electrónico . Cons[facility]ello [facility] recibirá yvonne notificación por correo electrónico cuando la nueva información está disponible en MyChart . Heaven Coupe clic en Registrarse. Juan Elizabeth ash y descargar porciones de deshpande expediente médico. 
George clic en el enlace de descarga del menú Resumen para descargar yvonne copia portátil de deshpande información médica . Si tiene [employer] , por favor visite la sección de preguntas frecuentes del sitio web MyChart . Recuerde, MyChart NO es que se utilizará para las necesidades urgentes. Para emergencias médicas , llame al 911 . Ahora disponible en deshpande iPhone y Android ! Por favor proporcione jonn resumen de la documentación de cuidado a deshpande próximo proveedor. Your primary care clinician is listed as Fatmata Cashing. If you have any questions after today's visit, please call [phone].

## 2017-08-18 NOTE — PROGRESS NOTES
Chief Complaint   Patient presents with    Blood Pressure Check     1. Have you been to the ER, urgent care clinic since your last visit? Hospitalized since your last visit? No    2. Have you seen or consulted any other health care providers outside of the [address] [place] [address] since your last visit? Include any pap smears or colon screening.  No

## 2017-08-18 NOTE — PROGRESS NOTES
Subjective  Maty Tse is an 80 y.o. female who presents for BP check    Pt is with son who is helping to translate. She and son declined use of telephone  after it was offered. Hypertension: pt has been having home health visit who has been taking BP. Running 130-150/70s. Compliant with her medication of candesartan 32 mg. No chest pain. No SOB. No headaches or dizziness. No concern from son or pt today. Allergies - reviewed:   No Known Allergies      Medications - reviewed:   Current Outpatient Prescriptions   Medication Sig    acetaminophen (TYLENOL) 325 mg tablet Take 650 mg by mouth every six (6) hours as needed for Pain.  aspirin 81 mg chewable tablet Take 1 Tab by mouth daily.  atorvastatin (LIPITOR) 10 mg tablet Take 1 Tab by mouth nightly. Indications: prevention of cerebrovascular accident    candesartan (ATACAND) 32 mg tablet Take 32 mg by mouth daily.  glimepiride (AMARYL) 2 mg tablet Take 2 mg by mouth every morning. No current facility-administered medications for this visit. Past Medical History - reviewed:  Past Medical History:   Diagnosis Date    Acute CVA (cerebrovascular accident) ([facility] [address] 8/2/2017    L occipital lobe, Mid and posterior, L temporal lobe, and L thalamus    Diabetes ([facility] [address] [facility]     Hypertension          Past Surgical History - reviewed:   Past Surgical History:   Procedure Laterality Date    HX OTHER SURGICAL      tonsils         Social History - reviewed:  Social History     Social History    Marital status: SINGLE     Spouse name: N/A    Number of children: N/A    Years of education: N/A     Occupational History    Not on file. Social History Main Topics    Smoking status: Never Smoker    Smokeless tobacco: Never Used    Alcohol use No    Drug use: No    Sexual activity: No     Other Topics Concern    Not on file     Social History Narrative         Family History - reviewed:  History reviewed.  No pertinent family history. ROS  General/Constitutional:   No headache, fever, fatigue, weight loss or weight gain       Cardiac:    No chest pain      Respiratory:   No cough or shortness of breath     GI:   No nausea/vomiting, diarrhea, abdominal pain, bloody or dark stools       Neurological:   No loss of consciousness, dizziness, seizures, dysarthria. Weakness in the right arm. Physical Exam  Visit Vitals    /70 (BP 1 Location: Right arm, BP Patient Position: Sitting)    Pulse 88    Temp 97.8 °F (36.6 °C) (Oral)    Resp 20    Ht 5' 4\" (1.626 m)    Wt 143 lb (64.9 kg)    SpO2 96%    BMI 24.55 kg/m2     Recheck /70    GEN: No apparent distress. Alert and oriented and responds. Cooperative. LUNGS: Respirations unlabored; clear to auscultation bilaterally  CARDIOVASCULAR: Regular, rate, and rhythm without murmurs, gallops or rubs   ABDOMEN: Soft; nontender; nondistended; normoactive bowel sounds; no masses or organomegaly  EXT: Well perfused. No edema. Assessment/Plan    ICD-10-CM ICD-9-CM    1. Essential hypertension I10 401.9      Hypertension: BP slightly above goal, but stable in this 80 yr old female at 150s/70s with times in the 130s/70s. As she is asymptomatic there is not need to add new BP medication to decrease BP, by doing so we also decrease chance of falls 2/2 to low BP. reassured by continued home health visits and specialist follow-ups who log BP into the system. Son educated to bring back pt if BP increase above 160/90 and remains elevated. Precautions given. All questions answered. Follow-up Disposition:  Return for for prevanitive visit. I have discussed the diagnosis with the patient and the intended plan as seen in the above orders. Patient verbalized understanding of the plan and agrees with the plan. The patient has received an after-visit summary and questions were answered concerning future plans.  Informed patient to return to the office if new symptoms arise.         Danyelle Meeks, DO  Family Medicine Resident

## 2017-08-19 NOTE — PATIENT INSTRUCTIONS
Learning About High Blood Pressure  What is high blood pressure? Blood pressure is a measure of how hard the blood pushes against the walls of your arteries. It's normal for blood pressure to go up and down throughout the day, but if it stays up, you have high blood pressure. Another name for high blood pressure is hypertension. Two numbers tell you your blood pressure. The first number is the systolic pressure. It shows how hard the blood pushes when your heart is pumping. The second number is the diastolic pressure. It shows how hard the blood pushes between heartbeats, when your heart is relaxed and filling with blood. A blood pressure of less than 120/80 (say \"120 over 80\") is ideal for an adult. High blood pressure is 140/90 or higher. You have high blood pressure if your top number is 140 or higher or your bottom number is 90 or higher, or both. Many people fall into the category in between, called prehypertension. People with prehypertension need to make lifestyle changes to bring their blood pressure down and help prevent or delay high blood pressure. What happens when you have high blood pressure? · Blood flows through your arteries with too much force. Over time, this damages the walls of your arteries. But you can't feel it. High blood pressure usually doesn't cause symptoms. · Fat and calcium start to build up in your arteries. This buildup is called plaque. Plaque makes your arteries narrower and stiffer. Blood can't flow through them as easily. · This lack of good blood flow starts to damage some of the organs in your body. This can lead to problems such as coronary artery disease and heart attack, heart failure, stroke, kidney failure, and eye damage. How can you prevent high blood pressure? · Stay at a healthy weight. · Try to limit how much sodium you eat to less than 2,300 milligrams (mg) a day.  If you limit your sodium to 1,500 mg a day, you can lower your blood pressure even more.  ¨ Buy foods that are labeled \"unsalted,\" \"sodium-free,\" or \"low-sodium. \" Foods labeled \"reduced-sodium\" and \"light sodium\" may still have too much sodium. ¨ Flavor your food with garlic, lemon juice, onion, vinegar, herbs, and spices instead of salt. Do not use soy sauce, steak sauce, onion salt, garlic salt, mustard, or ketchup on your food. ¨ Use less salt (or none) when recipes call for it. You can often use half the salt a recipe calls for without losing flavor. · Be physically active. Get at least 30 minutes of exercise on most days of the week. Walking is a good choice. You also may want to do other activities, such as running, swimming, cycling, or playing tennis or team sports. · Limit alcohol to 2 drinks a day for men and 1 drink a day for women. · Eat plenty of fruits, vegetables, and low-fat dairy products. Eat less saturated and total fats. How is high blood pressure treated? · Your doctor will suggest making lifestyle changes. For example, your doctor may ask you to eat healthy foods, quit smoking, lose extra weight, and be more active. · If lifestyle changes don't help enough or your blood pressure is very high, you will have to take medicine every day. Follow-up care is a key part of your treatment and safety. Be sure to make and go to all appointments, and call your doctor if you are having problems. It's also a good idea to know your test results and keep a list of the medicines you take. Where can you learn more? Go to [email or web address]/. Enter P501 in the search box to learn more about \"Learning About High Blood Pressure. \"  Current as of: March 23, 2016  Content Version: 11.3  © 7730-0075 [employer]. Care instructions adapted under license by [employer] (which disclaims liability or warranty for this information).  If you have questions about a medical condition or this instruction, always ask your healthcare professional. [employer] disclaims any warranty or liability for your use of this information.

## 2017-08-20 VITALS
HEART RATE: 72 BPM | TEMPERATURE: 98.2 F | DIASTOLIC BLOOD PRESSURE: 62 MMHG | SYSTOLIC BLOOD PRESSURE: 128 MMHG | OXYGEN SATURATION: 97 % | RESPIRATION RATE: 17 BRPM

## 2017-08-22 ENCOUNTER — HOME CARE VISIT (OUTPATIENT)
Dept: SCHEDULING | Facility: HOME HEALTH | Age: 82
End: 2017-08-22
Payer: COMMERCIAL

## 2017-08-22 VITALS
DIASTOLIC BLOOD PRESSURE: 78 MMHG | TEMPERATURE: 98.4 F | OXYGEN SATURATION: 94 % | HEART RATE: 64 BPM | SYSTOLIC BLOOD PRESSURE: 120 MMHG

## 2017-08-22 PROCEDURE — G0152 HHCP-SERV OF OT,EA 15 MIN: HCPCS

## 2017-08-24 ENCOUNTER — OFFICE VISIT (OUTPATIENT)
Dept: NEUROLOGY | Age: 82
End: 2017-08-24

## 2017-08-24 ENCOUNTER — TELEPHONE (OUTPATIENT)
Dept: NEUROLOGY | Age: 82
End: 2017-08-24

## 2017-08-24 ENCOUNTER — HOME CARE VISIT (OUTPATIENT)
Dept: SCHEDULING | Facility: HOME HEALTH | Age: 82
End: 2017-08-24
Payer: COMMERCIAL

## 2017-08-24 VITALS
HEIGHT: 64 IN | BODY MASS INDEX: 24.59 KG/M2 | WEIGHT: 144 LBS | DIASTOLIC BLOOD PRESSURE: 90 MMHG | SYSTOLIC BLOOD PRESSURE: 158 MMHG

## 2017-08-24 DIAGNOSIS — I63.9 ACUTE CVA (CEREBROVASCULAR ACCIDENT) (HCC): Primary | ICD-10-CM

## 2017-08-24 NOTE — PROGRESS NOTES
Pt was seen in [address] [place] [address] 8/1/17-8/3/17 for stroke. Pt is accompanied by her grandson. He translates for her.

## 2017-08-24 NOTE — MR AVS SNAPSHOT
Visit Information Maria Fernanda Barillas Personal Médico Departamento Teléfono del Dep. Número de visita 8/24/2017  1:30 PM George Crowe NP [facility] [phone] Follow-up Instructions Return if symptoms worsen or fail to improve. Upcoming Health Maintenance Date Due  
 FOOT EXAM Q1 11/3/1936 MICROALBUMIN Q1 11/3/1936 EYE EXAM RETINAL OR DILATED Q1 11/3/1936 DTaP/Tdap/Td series (1 - Tdap) 11/3/1947 ZOSTER VACCINE AGE 60> 9/3/1986 GLAUCOMA SCREENING Q2Y 11/3/1991 OSTEOPOROSIS SCREENING (DEXA) 11/3/1991 Pneumococcal 65+ Low/Medium Risk (1 of 2 - PCV13) 11/3/1991 MEDICARE YEARLY EXAM 11/3/1991 INFLUENZA AGE 9 TO ADULT 8/1/2017 HEMOGLOBIN A1C Q6M 2/1/2018 LIPID PANEL Q1 8/2/2018 Alergias  Review Complete El: 8/24/2017 Por: Alejandro Fish A partir del:  8/24/2017 No Known Allergies Vacunas actuales Arland Jobs No hay ninguna vacuna archivada. No revisadas esta visita Partes vitales PS Left Hand ( percentil de crecimiento) Peso (percentil de crecimiento) BMI ([facility] Estado obstétrico Estatus de tabaquísmo 158/90 5' 4\" (1.626 m) 144 lb (65.3 kg) 24.72 kg/m2 Postmenopausal Never Smoker Historial de signos vitales BMI and BSA Data Body Mass Index Body Surface Area 24.72 kg/m 2 1.72 m 2 Marie lista de medicamentos actualizada Lista actualizada el: 8/24/17  2:03 PM.  Vanessa Rumps use marie lista de medicamentos más reciente. acetaminophen 325 mg tablet También conocido jaswinder:  TYLENOL Take 650 mg by mouth every six (6) hours as needed for Pain. aspirin 81 mg chewable tablet Take 1 Tab by mouth daily. atorvastatin 10 mg tablet También conocido jaswinder:  LIPITOR Take 1 Tab by mouth nightly. Indications: prevention of cerebrovascular accident  
  
 candesartan 32 mg tablet También conocido jaswinder:  ATACAND Take 32 mg by mouth daily. glimepiride 2 mg tablet También conocido jaswinder:  AMARYL Take 2 mg by mouth every morning. * OTHER Tub seat * OTHER Rolling walker with seat * Aviso:  Esta lista contiene medicamentos que son iguales a otros medicamentos recetados para usted. Celestina las instrucciones con cuidado y pida a deshpande personal médico que revise la lista de medicamentos y las instrucciones correspondientes con usted. Impresion de recetas Refills OTHER 0 Sig: Tub seat Class: Print OTHER 0 Sig: Rolling walker with seat Class: Print Instrucciones de seguimiento Return if symptoms worsen or fail to improve. Por hacer 08/24/2017 To Be Determined Appointment with Ant Urban RN at [place] [address]  
  
 08/29/2017 To Be Determined Appointment with Rita Stern at [place] [address] Instrucciones para el Paciente PRESCRIPTION REFILL POLICY [facility] Statement to Patients April 1, 2014 In an effort to ensure the large volume of patient prescription refills is processed in the most efficient and expeditious manner, we are asking our patients to assist us by calling your Pharmacy for all prescription refills, this will include also your  Mail Order Pharmacy. The pharmacy will contact our office electronically to continue the refill process. Please do not wait until the last minute to call your pharmacy. We need at least 48 hours (2days) to fill prescriptions. We also encourage you to call your pharmacy before going to  your prescription to make sure it is ready. With regard to controlled substance prescription refill requests (narcotic refills) that need to be picked up at our office, we ask your cooperation by providing us with at least 72 hours (3days) notice that you will need a refill. We will not refill narcotic prescription refill requests after 4:00pm on any weekday, Monday through Thursday, or after 2:00pm on Fridays, or on the weekends. We encourage everyone to explore another way of getting your prescription refill request processed using [employer], our patient web portal through our electronic medical record system. [employer] is an efficient and effective way to communicate your medication request directly to the office and  downloadable as an hilda on your smart phone . [employer] also features a review functionality that allows you to view your medication list as well as leave messages for your physician. Are you ready to get connected? If so please review the attatched instructions or speak to any of our staff to get you set up right away! Thank you so much for your cooperation. Should you have any questions please contact our Practice Administrator. The Physicians and Staff,  [facility] Aprenda sobre los testamentos vitales - [ Lakshmi Ruiz 1721 ] [place] es un testamento vital? 
Un testamento vital es un formulario legal que se utiliza para escribir el tipo de cuidado que usted desea al final de deshpande oskar. Lo utilizan los profesionales de la bella que le tratarán, si no puede decidir por sí mismo. Si usted pone por escrito bill deseos, bill seres queridos y los demás sabrán qué tipo de atención desea. No tendrán que adivinar. Walsh puede darle durant interior y ser útil a los demás. Un testamento vital no es lo mismo que un testamento de patrimonio o propiedad. Un testamento de patrimonio explica lo que usted quiere que suceda con deshpande dinero y bill bienes después de deshpande muerte. Es un testamento vital un documento legal? 
Un testamento vital es un documento legal. Cada estado tiene bill propias leyes sobre los testamentos vitales.  Si usted se muda a [employer], asegúrese de que deshpande testamento vital sea legal en el estado donde ahora vive. O podría usar un formulario universal que haya sido aprobado por muchos estados. Isamar tipo de formulario a veces puede completarse y almacenarse en línea. Gleen Corona, deshpande copia electrónica estará disponible siempre que tenga yvonne conexión a Internet. En la [employer], los médicos respetarán bill deseos incluso si tiene un formulario de un estado diferente. · Usted no necesita un abogado para completar un testamento vital. Magdalena puede serle útil la asesoría legal si las leyes del estado no son claras, deshpande historial de bella es complicado o bill familiares no pueden ponerse de acuerdo sobre lo que debe incluirse en deshpande testamento vital. 
· Usted puede cambiar deshpande testamento vital en cualquier momento. Algunas personas encuentran que bill deseos de atención al final de la oskar [place] a medida que deshpande bella cambia. · Además de hacer un testamento vital, piense en completar un poder legal médico. Isamar formulario le permite nombrar a la persona que desea que tome por usted decisiones de tratamiento al final de deshpande oskar (deshpande \"agente de atención médica\") si no puede hacerlo. Muchos hospitales y hogares de [employer]  darán los formularios que necesita para completar un testamento vital y un poder legal médico. 
· Deshpande testamento vital solo se utiliza si usted ya no puede anushka o comunicar decisiones por sí mismo. Si vuelve a ser Lilian Bunkers de anushka decisiones [employer], usted puede aceptar o rechazar cualquier tratamiento, sin importar lo que usted escribió en deshpande testamento vital. 
· Es posible que deshpande estado ofrezca un registro en línea. Isamar es un lugar donde usted puede almacenar en línea deshpande testamento vital para que los médicos y enfermeras que necesitan tratarle puedan encontrarlo de inmediato. Qué debe anushka en cuenta a la hora de formular un testamento vital? 
Hable sobre bill deseos al fin de la oskar con bill familiares y deshpande médico. Comuníqueles lo que quiere.  De esta manera la gente que tome decisiones por usted no estará sorprendida por bill elecciones. Considere las siguientes preguntas a medida que elabora deshpande testamento vital: 
· Conoce lo suficiente [facility] de soporte vital que podrían utilizarse? Si no, hable con deshpande médico de [employer] sepa qué se podría hacer si no puede respirar por sí mismo, deshpande corazón se detiene o no es capaz de tragar. · 
Qué cosas desearía todavía poder hacer después de recibir métodos de soporte vital? 
Still Hoyles ser Sharren Sobia de caminar? Hablar? [place] por deshpande Binnie Mowers? Vivir sin la ayuda de máquinas? · Si puede elegir, 
dónde quiere ser atendido? En deshpande casa? En un hospital o en un [facility]? · 
Quiere que se lleven a cabo ciertas prácticas religiosas si usted se pone muy enfermo? · Si tiene yvonne opción al final de deshpande oskar, 
dónde preferiría morir? En casa? En un hospital u [facility]? En otro lugar? · Preferiría ser Ellie Alexia o cremado? · 
Quiere que bill órganos se donen después de deshpande muerte? Qué debe hacer con deshpande testamento vital? 
· Asegúrese de que bill familiares y deshpande agente de atención médica tengan copias de deshpande testamento vital. 
· Andrea a deshpande médico yvonne copia de deshpande testamento vital para mantenerlo en deshpande expediente médico. Si tiene más de un médico, asegúrese de que cada joan tenga yvonne copia. · Zeenat Lauren poner yvonne copia de deshpande testamento vital donde se pueda encontrar fácilmente. Dónde puede encontrar más información en inglés? Jean Carlos Owens a [email or web address] Eveline BRENNAN1 en la búsqueda para aprender más acerca de \"Aprenda sobre los testamentos vitales - [ [facility] ]. \" 
Revisado: 24 septiembre, 2016 Versión del contenido: 11.3 © 5059-7558 [employer]. Las instrucciones de cuidado fueron adaptadas bajo licencia por [employer] (which disclaims liability or warranty for this information).  Si usted tiene preguntas sobre yvonne afección médica o sobre estas instrucciones, siempre pregunte a deshpande profesional de bella. [facility] [employer]orporated niega toda garantía o responsabilidad por deshpande uso de esta información. Instrucciones médicas por anticipado: Instrucciones de cuidado - [ Advance Directives: Care Instructions ] Instrucciones de cuidado Las instrucciones médicas por anticipado son Loly Blackwood legal de afirmar bill deseos al final de deshpande oskar. Informa a deshpande mario y deshpande médico acerca de lo que deben hacer si usted ya no puede expresar lo que desea. Kory Ocampo tipos principales de instrucciones médicas por anticipado. Usted puede modificarlas en cualquier momento que bill deseos cambien. · Un testamento vital le comunica a deshpande mario y a deshpande médico bill deseos acerca del mantenimiento artificial de la oskar y otros tratamientos. · Un poder notarial permanente para asuntos médicos le permite designar a yvonne persona para que tome decisiones de tratamiento en deshpande nombre cuando usted no pueda expresarse por sí mismo. Esta persona se conoce jaswinder representante de [facility] [employer]. Si no tiene instrucciones médicas por anticipado, las decisiones relacionadas con bill cuidados médicos podrían ser tomadas por un médico o juez que no lo conoce. Puede ser útil pensar en las instrucciones médicas por anticipado jaswinder un regalo a las personas que lo Angeline Carry. Si tiene instrucciones médicas por anticipado, bill seres queridos no tendrán que anushka decisiones difíciles por sí solos. La atención de seguimiento es yvonne parte clave de deshpande tratamiento y seguridad. Asegúrese de hacer y acudir a todas las citas, y llame a deshpande médico si está teniendo problemas. También es yvonne buena idea saber los resultados de bill exámenes y mantener yvonne lista de los medicamentos que maryam. Cómo puede cuidarse en el hogar? · Hable con bill seres queridos y deshpande médico acerca de bill deseos. [employer] modo, no habrá sorpresas. · Muchos estados tienen un formulario específico. O podría usar un formulario universal que haya sido aprobado por muchos estados. Isamar tipo de formulario a veces puede completarse y almacenarse en línea. Mary Fitting, deshpande copia electrónica estará disponible siempre que tenga yvonne conexión a Internet. En la [employer], los médicos respetarán bill deseos incluso si tiene un formulario de un estado diferente. · No necesita un abogado para preparar instrucciones médicas por anticipado. No obstante, anais vez desee contar con asesoría legal. 
· Piense en estas preguntas cuando prepare las instrucciones médicas por anticipado: ¨ Leonidas Little que tome las decisiones sobre deshpande atención médica si usted no puede hacerlo? Muchas Lissette Dante a un familiar o a un amigo íntimo. ¨ 
Conoce lo suficiente acerca de los métodos de soporte vital que podrían utilizarse? Si no, hable con deshpande médico para que se lo explique. ¨ 
Qué es lo que más teme que podría pasar? Podría tener miedo al Yulia Melendez, a perder la independencia o mantener la oskar por medios artificiales (de máquinas). ¨ 
Dónde preferiría morir? Puede ser en deshpande casa, en un hospital o en un hogar para ancianos. ¨ Le gustaría obtener información acerca de la atención en centros de cuidados paliativos para brindarle apoyo a usted y a deshpande mario? ¨ Natalie Vero donar bill [place]? ¨ Le gustaría hacer alguna ceremonia religiosa antes de morir? En lloyd afirmativo, incluya bill deseos en las instrucciones médicas por anticipado. · Celestina bill instrucciones por anticipado todos los años y moris las modificaciones que crea necesarias. Cuándo debe pedir ayuda? Asegúrese de comunicarse con deshpande médico si tiene preguntas. Dónde puede encontrar más información en inglés? Minor Cordia a [email or web address]  
Davi Leon S119 en la búsqueda para aprender más acerca de \"Instrucciones médicas por anticipado: Instrucciones de cuidado - [ Advance Directives: Care Instructions ]. \" 
Revisado: 17 noviembre, 2016 Versión del contenido: 11.3 © 8224-6791 [employer]wise, Incorporated. Las instrucciones de cuidado fueron adaptadas bajo licencia por [employer] (which disclaims liability or warranty for this information). Si usted tiene [place] afección médica o sobre estas instrucciones, siempre pregunte a deshpande profesional de bella. Healthwise, Incorporated niega toda garantía o responsabilidad por deshpande uso de esta información. Introducing [facility] SERVICES! [facility] introduce portal paciente MyChart . Ahora se puede acceder a partes de deshpande expediente médico, enviar por correo electrónico la oficina de deshpande médico y solicitar renovaciones de medicamentos en línea. En deshpande navegador de Internet , Sarah Pollack a [email or web address] Moris clic en el usuario por [employer]? Elgie [place] clic aquí en la sesión Laverda Halbur. Verá la página de registro [place]. Ingrese deshpande código de [employer] anais y jaswinder aparece a continuación. Usted no tendrá que [employer] código después de elver completado el proceso de registro . Si usted no se inscribe antes de la fecha de caducidad , debe solicitar un nuevo código. · MyChart Código de acceso : [id number] Expires: 10/31/2017  1:42 PM 
 
Ingresa los últimos cuatro dígitos de deshpande Número de Seguro Social ( xxxx ) y fecha de nacimiento ( dd / mm / aaaa ) jaswinder se indica y moris clic en Enviar. Girish será llevado a la siguiente página de registro . Crear un ID MyChart . Esta será deshpande ID de inicio de sesión de MyChart y no puede ser [place] , por lo que pensar en yvonne que es Laurance Michelle y fácil de recordar . Crear yvonne contraseña MyChart . Usted puede cambiar deshpande contraseña en cualquier momento . Ingrese deshpande Password Reset de preguntas y [place] . [place] se puede utilizar en un momento posterior si usted olvida deshpande contraseña.  
Introduzca deshpande dirección de correo electrónico . Pastora Ford recibirá Margarita Ser notificación por correo electrónico cuando la nueva información está disponible en MyChart . Drea del angel en Registrarse. Brigette Hence ash y descargar porciones de deshpande expediente médico. 
George cljose en el enlace de descarga del menú Resumen para descargar yvonne copia portátil de deshpande información médica . Si tiene [employer] , por favor visite la sección de preguntas frecuentes del sitio web [employer] . Recuerde, [place] NO es que se utilizará para las necesidades urgentes. Para emergencias médicas , llame al 911 . Ahora disponible en deshpande iPhone y Android ! Por favor proporcione jonn resumen de la documentación de cuidado a deshpande próximo proveedor. Your primary care clinician is listed as Jamal Form. If you have any questions after today's visit, please call [phone].

## 2017-08-24 NOTE — PATIENT INSTRUCTIONS
[facility]   Statement to Patients  April 1, 2014      In an effort to ensure the large volume of patient prescription refills is processed in the most efficient and expeditious manner, we are asking our patients to assist us by calling your Pharmacy for all prescription refills, this will include also your  Mail Order Pharmacy. The pharmacy will contact our office electronically to continue the refill process. Please do not wait until the last minute to call your pharmacy. We need at least 48 hours (2days) to fill prescriptions. We also encourage you to call your pharmacy before going to  your prescription to make sure it is ready. With regard to controlled substance prescription refill requests (narcotic refills) that need to be picked up at our office, we ask your cooperation by providing us with at least 72 hours (3days) notice that you will need a refill. We will not refill narcotic prescription refill requests after 4:00pm on any weekday, Monday through Thursday, or after 2:00pm on Fridays, or on the weekends. We encourage everyone to explore another way of getting your prescription refill request processed using Hiddenbed, our patient web portal through our electronic medical record system. Hiddenbed is an efficient and effective way to communicate your medication request directly to the office and  downloadable as an ihlda on your smart phone . Hiddenbed also features a review functionality that allows you to view your medication list as well as leave messages for your physician. Are you ready to get connected? If so please review the attatched instructions or speak to any of our staff to get you set up right away! Thank you so much for your cooperation. Should you have any questions please contact our Practice Administrator.     The Physicians and Staff,  [facility]        Aprenda sobre los testamentos vitales - Ronald Hamilton About Living [place] ]  ¿Qué es un testamento vital?  Un testamento vital es un formulario legal que se [place] para escribir el tipo de cuidado que usted desea al final de deshpande oskar. Lo utilizan los profesionales de la bella que le tratarán, si no puede decidir por sí mismo. Si usted pone por escrito bill deseos, bill seres queridos y los demás sabrán qué tipo de atención desea. No tendrán que adivinar. [place] puede darle durant interior y ser útil a los demás. Un testamento vital no es lo mismo que un testamento de patrimonio o propiedad. Un testamento de patrimonio explica lo que usted quiere que suceda con deshpande dinero y bill bienes después de deshpande muerte. ¿Es un testamento vital un documento legal?  Un testamento vital es un documento legal. Cada estado tiene bill propias leyes sobre los testamentos vitales. Si usted se muda a [employer], asegúrese de que deshpande testamento vital sea legal en el estado donde ahora vive. O podría usar un formulario universal que haya sido aprobado por muchos estados. Isamar tipo de formulario a veces puede completarse y almacenarse en línea. Deandie Pryor, deshpande copia electrónica estará disponible siempre que tenga yvonne conexión a Internet. En la [employer], los médicos respetarán bill deseos incluso si tiene un formulario de un estado diferente. · Usted no necesita un abogado para completar un testamento vital. Magdalena puede serle útil la asesoría legal si las leyes del estado no son claras, deshpande historial de bella es complicado o bill familiares no pueden ponerse de acuerdo sobre lo que debe incluirse en deshpande testamento vital.  · Usted puede cambiar deshpande testamento vital en cualquier momento. Algunas personas encuentran que bill deseos de atención al final de la oskar [place] a medida que deshpande bella cambia.   · Además de hacer un testamento vital, piense en completar un poder legal médico. Isamar formulario le permite nombrar a la persona que desea que tome por usted decisiones de tratamiento al final de deshpande oskar (deshpande \"agente de atención médica\") si no puede hacerlo. Muchos hospitales y hogares de [employer]  darán los formularios que necesita para completar un testamento vital y un poder legal médico.  · Deshpande testamento vital solo se utiliza si usted ya no puede anushka o comunicar decisiones por sí mismo. Si vuelve a ser Ofelia [place] de anushka decisiones [employer], usted puede aceptar o rechazar cualquier tratamiento, sin importar lo que usted escribió en deshpande testamento vital.  · Es posible que deshpande estado ofrezca un registro en línea. Isamar es un lugar donde usted puede almacenar en línea deshpande testamento vital para que los médicos y enfermeras que necesitan tratarle puedan encontrarlo de inmediato. ¿Qué debe anushka en cuenta a la hora de formular un testamento vital?  Hable sobre bill deseos al fin de la oskar con bill familiares y deshpande médico. Comuníqueles lo que quiere. De esta manera la gente que tome decisiones por usted no estará sorprendida por bill elecciones. Considere las siguientes preguntas a medida que elabora deshpande testamento vital:  · ¿Conoce lo suficiente sobre los métodos de soporte vital que podrían utilizarse? Si no, hable con deshpande médico de [facility] sepa qué se podría hacer si no puede respirar por sí mismo, deshpande corazón se detiene o no es capaz de tragar. · ¿Qué cosas desearía todavía poder hacer después de recibir métodos de soporte vital? ¿Le gustaría ser Ofelia [place] de caminar? ¿Hablar? ¿[place] por deshpande cuenta? ¿Vivir sin la ayuda de máquinas? · Si puede elegir, ¿dónde quiere ser atendido? ¿En deshpande casa? ¿En un hospital o en un [facility]? · ¿Quiere que se lleven a cabo ciertas prácticas religiosas si usted se pone muy enfermo? · Si tiene yvonne opción al final de deshpande oskar, ¿dónde preferiría morir? ¿En casa? ¿En un hospital u [facility]? [employer] [place] lugar? · ¿Preferiría ser Criselda Penta o cremado? · ¿Quiere que bill órganos se donen después de deshpande muerte?   ¿Qué debe hacer con deshpande testamento vital?  · Asegúrese de que bill familiares y deshpande agente de atención médica tengan copias de deshpande testamento vital.  · Andrea a deshpande médico yvonne copia de deshpande testamento vital para mantenerlo en deshpande expediente médico. Si tiene más de un médico, asegúrese de que cada joan tenga yvonne copia. · Olivia Blanchard poner yvonne copia de deshpande testamento vital donde se pueda encontrar fácilmente. ¿Dónde puede encontrar más información en inglés? Loly Roberts a [email or web address].[email or web address] Evie Fang S630 en la búsqueda para aprender más acerca de \"Aprenda sobre los testamentos vitales - [ Feliciano Ackerman ]. \"  Revisado: 24 septiembre, 2016  Versión del contenido: 11.3  © 4636-0262 Healthwise, Incorporated. Las instrucciones de cuidado fueron adaptadas bajo licencia por [facility] (which disclaims liability or warranty for this information). Si usted tiene [place] afección médica o sobre estas instrucciones, siempre pregunte a deshpande profesional de bella. Healthwise, Incorporated niega toda garantía o responsabilidad por deshpande uso de esta información. Instrucciones médicas por anticipado: Instrucciones de cuidado - [ Advance Directives: Care Instructions ]  Instrucciones de cuidado  Las instrucciones médicas por anticipado son Lizeth Carbine legal de afirmar bill deseos al final de deshpande oskar. Informa a deshpande mario y deshpande médico acerca de lo que deben hacer si usted ya no puede expresar lo que desea. Kory Ocampo tipos principales de instrucciones médicas por anticipado. Usted puede modificarlas en cualquier momento que bill deseos cambien. · Un testamento vital le comunica a deshpande mario y a deshpande médico bill deseos acerca del mantenimiento artificial de la oskar y otros tratamientos. · Un poder notarial permanente para asuntos médicos le permite designar a yvonne persona para que tome decisiones de tratamiento en deshpande nombre cuando usted no pueda expresarse por sí mismo. Esta persona se conoce jaswinder representante de [facility] [employer].   Si no tiene instrucciones médicas por anticipado, las decisiones relacionadas con bill cuidados médicos podrían ser tomadas por un médico o juez que no lo conoce. Puede ser útil pensar en las instrucciones médicas por anticipado jaswinder un regalo a las personas que lo Roanne Payor. Si tiene instrucciones médicas por anticipado, bill seres queridos no tendrán que anushka decisiones difíciles por sí solos. La atención de seguimiento es yvonne parte clave de deshpande tratamiento y seguridad. Asegúrese de hacer y acudir a todas las citas, y llame a deshpande médico si está teniendo problemas. También es yvonne buena idea saber los resultados de bill exámenes y mantener yvonne lista de los medicamentos que maryam. ¿Cómo puede cuidarse en el hogar? · Hable con bill seres queridos y deshpande médico acerca de bill deseos. [employer] modo, no habrá sorpresas. · Muchos estados tienen un formulario específico. O podría usar un formulario universal que haya sido aprobado por muchos estados. Isamar tipo de formulario a veces puede completarse y almacenarse en línea. Sol Bouillon, deshpande copia electrónica estará disponible siempre que tenga yvonne conexión a Internet. En la [employer], los médicos respetarán bill deseos incluso si tiene un formulario de un estado diferente. · No necesita un abogado para preparar instrucciones médicas por anticipado. No obstante, anais vez desee contar con asesoría legal.  · Piense en estas preguntas cuando prepare las instrucciones médicas por anticipado:  ¨ ¿Quién quiere que tome las decisiones sobre deshpande atención médica si usted no puede hacerlo? Muchas Hetal Clack a un familiar o a un amigo íntimo. ¨ ¿Conoce lo suficiente acerca de los métodos de soporte vital que podrían utilizarse? Si no, hable con deshpande médico para que se lo explique. ¨ ¿Qué es lo que más teme que podría pasar? Podría tener miedo al Yulia Melendez, a perder la independencia o mantener la oskar por medios artificiales (de máquinas). ¨ ¿Dónde preferiría morir?  Puede ser en deshpande casa, en un hospital o en un hogar para ancianos. ¨ ¿Le gustaría obtener información acerca de la atención en centros de cuidados paliativos para brindarle apoyo a usted y a deshpande mario? ¨ ¿Le gustaría donar bill órganos cuando Alex? ¨ ¿Le gustaría hacer alguna ceremonia religiosa antes de morir? En lloyd afirmativo, incluya bill deseos en las instrucciones médicas por anticipado. · Celestina bill instrucciones por anticipado todos los años y moris las modificaciones que crea necesarias. ¿Cuándo debe pedir ayuda? Asegúrese de comunicarse con deshpande médico si tiene preguntas. ¿Dónde puede encontrar más información en inglés? Abby Givens a [email or web address] Adrian Her P840 en la búsqueda para aprender más acerca de \"Instrucciones médicas por anticipado: Instrucciones de cuidado - [ Advance Directives: Care Instructions ]. \"  Revisado: 17 noviembre, 2016  Versión del contenido: 11.3  © 7364-5588 Healthwise, Incorporated. Las instrucciones de cuidado fueron adaptadas bajo licencia por [facility] (which disclaims liability or warranty for this information). Si usted tiene [place] afección médica o sobre estas instrucciones, siempre pregunte a deshpande profesional de bella. NCR [employer] niega toda garantía o responsabilidad por deshpande uso de esta información.

## 2017-08-24 NOTE — PROGRESS NOTES
Jered Chan is a 80 y.o. 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 She developed at home right sided weakness, pain,  Numbness. Went to the ED and was found to have an acute CVA. She has been getting PT and OT and doing well with these. PT has signed off and she has been doing well with balance, coordination. No falls. OT is doing another weak to help strengthen her right side more. No trouble with speech or swallowing. She notices she is doing better.  is grandsharri and he believes she has been progressing well. Almost back to baseline. Living with his mother. Taking aspirin and lipitor. No other concerns at the moment. Appetite and sleep are good. No Known Allergies    Current Outpatient Prescriptions   Medication Sig    OTHER Tub seat    OTHER Rolling walker with seat    acetaminophen (TYLENOL) 325 mg tablet Take 650 mg by mouth every six (6) hours as needed for Pain.  aspirin 81 mg chewable tablet Take 1 Tab by mouth daily.  atorvastatin (LIPITOR) 10 mg tablet Take 1 Tab by mouth nightly. Indications: prevention of cerebrovascular accident    candesartan (ATACAND) 32 mg tablet Take 32 mg by mouth daily.  glimepiride (AMARYL) 2 mg tablet Take 2 mg by mouth every morning. No current facility-administered medications for this visit.         History   Smoking Status    Never Smoker   Smokeless Tobacco    Never Used       Past Medical History:   Diagnosis Date    Acute CVA (cerebrovascular accident) ([address] 8/2/2017    L occipital lobe, Mid and posterior, L temporal lobe, and L thalamus    Diabetes ([address]     Hypertension        Past Surgical History:   Procedure Laterality Date    HX OTHER SURGICAL      tonsils       Family History   Problem Relation Age of Onset    No Known Problems Mother     No Known Problems Father  No Known Problems Sister     No Known Problems Brother     No Known Problems Maternal Aunt     No Known Problems Maternal Uncle     No Known Problems Paternal Aunt     No Known Problems Paternal Uncle     No Known Problems Maternal Grandmother     No Known Problems Maternal Grandfather     No Known Problems Paternal Grandmother     No Known Problems Paternal Grandfather     No Known Problems Other        Social History     Social History    Marital status: SINGLE     Spouse name: N/A    Number of children: N/A    Years of education: N/A     Social History Main Topics    Smoking status: Never Smoker    Smokeless tobacco: Never Used    Alcohol use No    Drug use: No    Sexual activity: No     Other Topics Concern    None     Social History Narrative       Review of Systems   HENT: Negative for ear pain, hearing loss and tinnitus. Eyes: Negative for blurred vision, double vision and photophobia. Respiratory: Negative for shortness of breath and wheezing. Gastrointestinal: Negative for nausea and vomiting. Neurological: Positive for weakness. Negative for dizziness, tingling, sensory change, speech change, focal weakness, seizures, loss of consciousness and headaches. Remainder of comprehensive review is negative. Physical Exam :    Visit Vitals    /90    Ht 5' 4\" (1.626 m)    Wt 65.3 kg (144 lb)    BMI 24.72 kg/m2       General: Well defined, nourished, and groomed individual in no acute distress.    Neck: Supple, nontender, no bruits, no pain with resistance to active range of motion.    Heart: Regular rate and rhythm, no murmurs, rub, or gallop. Normal S1S2.   Lungs: Clear to auscultation bilaterally with equal chest expansion, no cough, no wheeze  Musculoskeletal: Extremities revealed no edema and had full range of motion of joints.    Psych: Good mood and bright affect    NEUROLOGICAL EXAMINATION:    Mental Status: Alert and oriented to person, place, and time    Cranial Nerves:    II, III, IV, VI: Visual acuity grossly intact. Visual fields are normal.    Pupils are equal, round, and reactive to light and accommodation.    Extra-ocular movements are full and fluid. Fundoscopic exam was benign, no ptosis or nystagmus.    V-XII: Hearing is grossly intact. Facial features are symmetric, with normal sensation and strength. The palate rises symmetrically and the tongue protrudes midline. Sternocleidomastoids 5/5. Motor Examination: Normal tone, bulk, and strength, 5/5 muscle strength throughout. Coordination: Finger to nose was normal. No resting or intention tremor    Gait and Station: Steady while walking. Normal arm swing. No pronator drift. No muscle wasting or fasiculations noted. Reflexes: DTRs 2+ throughout. Results for orders placed or performed during the hospital encounter of 08/01/17   CBC WITH AUTOMATED DIFF   Result Value Ref Range    WBC 7.6 3.6 - 11.0 K/uL    RBC 4.71 3.80 - 5.20 M/uL    HGB 13.9 11.5 - 16.0 g/dL    HCT 43.1 35.0 - 47.0 %    MCV 91.5 80.0 - 99.0 FL    MCH 29.5 26.0 - 34.0 PG    MCHC 32.3 30.0 - 36.5 g/dL    RDW 12.9 11.5 - 14.5 %    PLATELET 951 402 - 152 K/uL    NEUTROPHILS 73 32 - 75 %    LYMPHOCYTES 21 12 - 49 %    MONOCYTES 6 5 - 13 %    EOSINOPHILS 0 0 - 7 %    BASOPHILS 0 0 - 1 %    ABS. NEUTROPHILS 5.5 1.8 - 8.0 K/UL    ABS. LYMPHOCYTES 1.6 0.8 - 3.5 K/UL    ABS. MONOCYTES 0.5 0.0 - 1.0 K/UL    ABS. EOSINOPHILS 0.0 0.0 - 0.4 K/UL    ABS.  BASOPHILS 0.0 0.0 - 0.1 K/UL   METABOLIC PANEL, COMPREHENSIVE   Result Value Ref Range    Sodium 141 136 - 145 mmol/L    Potassium 3.9 3.5 - 5.1 mmol/L    Chloride 105 97 - 108 mmol/L    CO2 30 21 - 32 mmol/L    Anion gap 6 5 - 15 mmol/L    Glucose 128 (H) 65 - 100 mg/dL    BUN 16 6 - 20 MG/DL    Creatinine 0.60 0.55 - 1.02 MG/DL    BUN/Creatinine ratio 27 (H) 12 - 20      GFR est AA >60 >60 ml/min/1.73m2    GFR est non-AA >60 >60 ml/min/1.73m2    Calcium 8.8 8.5 - 10.1 MG/DL Bilirubin, total 0.7 0.2 - 1.0 MG/DL    ALT (SGPT) 19 12 - 78 U/L    AST (SGOT) 17 15 - 37 U/L    Alk.  phosphatase 72 45 - 117 U/L    Protein, total 7.5 6.4 - 8.2 g/dL    Albumin 3.8 3.5 - 5.0 g/dL    Globulin 3.7 2.0 - 4.0 g/dL    A-G Ratio 1.0 (L) 1.1 - 2.2     URINALYSIS W/MICROSCOPIC   Result Value Ref Range    Color YELLOW/STRAW      Appearance CLOUDY (A) CLEAR      Specific gravity 1.015 1.003 - 1.030      pH (UA) 6.0 5.0 - 8.0      Protein NEGATIVE  NEG mg/dL    Glucose NEGATIVE  NEG mg/dL    Ketone NEGATIVE  NEG mg/dL    Bilirubin NEGATIVE  NEG      Blood NEGATIVE  NEG      Urobilinogen 1.0 0.2 - 1.0 EU/dL    Nitrites NEGATIVE  NEG      Leukocyte Esterase NEGATIVE  NEG      WBC 0-4 0 - 4 /hpf    RBC 0-5 0 - 5 /hpf    Epithelial cells FEW FEW /lpf    Bacteria NEGATIVE  NEG /hpf    Hyaline cast 0-2 0 - 5 /lpf   TROPONIN I   Result Value Ref Range    Troponin-I, Qt. <0.04 <0.05 ng/mL   MAGNESIUM   Result Value Ref Range    Magnesium 2.2 1.6 - 2.4 mg/dL   HEMOGLOBIN A1C WITH EAG   Result Value Ref Range    Hemoglobin A1c 6.7 (H) 4.2 - 6.3 %    Est. average glucose 146 mg/dL   TSH 3RD GENERATION   Result Value Ref Range    TSH 1.06 0.36 - 3.74 uIU/mL   T3, FREE   Result Value Ref Range    Free Triiodothyronine 2.7 2.2 - 4.0 pg/mL   T4, FREE   Result Value Ref Range    T4, Free 1.0 0.8 - 1.5 NG/DL   LIPID PANEL   Result Value Ref Range    LIPID PROFILE          Cholesterol, total 150 <200 MG/DL    Triglyceride 96 <150 MG/DL    HDL Cholesterol 51 MG/DL    LDL, calculated 79.8 0 - 100 MG/DL    VLDL, calculated 19.2 MG/DL    CHOL/HDL Ratio 2.9 0 - 5.0     CBC W/O DIFF   Result Value Ref Range    WBC 7.4 3.6 - 11.0 K/uL    RBC 4.16 3.80 - 5.20 M/uL    HGB 12.2 11.5 - 16.0 g/dL    HCT 37.7 35.0 - 47.0 %    MCV 90.6 80.0 - 99.0 FL    MCH 29.3 26.0 - 34.0 PG    MCHC 32.4 30.0 - 36.5 g/dL    RDW 12.8 11.5 - 14.5 %    PLATELET 178 428 - 213 K/uL   METABOLIC PANEL, BASIC   Result Value Ref Range    Sodium 141 136 - 145 mmol/L    Potassium 3.8 3.5 - 5.1 mmol/L    Chloride 107 97 - 108 mmol/L    CO2 25 21 - 32 mmol/L    Anion gap 9 5 - 15 mmol/L    Glucose 121 (H) 65 - 100 mg/dL    BUN 11 6 - 20 MG/DL    Creatinine 0.46 (L) 0.55 - 1.02 MG/DL    BUN/Creatinine ratio 24 (H) 12 - 20      GFR est AA >60 >60 ml/min/1.73m2    GFR est non-AA >60 >60 ml/min/1.73m2    Calcium 8.7 8.5 - 10.1 MG/DL   GLUCOSE, POC   Result Value Ref Range    Glucose (POC) 119 (H) 65 - 100 mg/dL    Performed by Childress Harriet ([facility])    GLUCOSE, POC   Result Value Ref Range    Glucose (POC) 105 (H) 65 - 100 mg/dL    Performed by Fior Larios ([facility])    GLUCOSE, POC   Result Value Ref Range    Glucose (POC) 151 (H) 65 - 100 mg/dL    Performed by Eliza Sweet    GLUCOSE, POC   Result Value Ref Range    Glucose (POC) 140 (H) 65 - 100 mg/dL    Performed by Zaho Aaron    GLUCOSE, POC   Result Value Ref Range    Glucose (POC) 132 (H) 65 - 100 mg/dL    Performed by Charli Ortiz (PCT)    GLUCOSE, POC   Result Value Ref Range    Glucose (POC) 121 (H) 65 - 100 mg/dL    Performed by Charli Ortiz (PCT)    GLUCOSE, POC   Result Value Ref Range    Glucose (POC) 128 (H) 65 - 100 mg/dL    Performed by Tito Kruger (PCT)    EKG, 12 LEAD, INITIAL   Result Value Ref Range    Ventricular Rate 96 BPM    Atrial Rate 96 BPM    P-R Interval 170 ms    QRS Duration 94 ms    Q-T Interval 356 ms    QTC Calculation (Bezet) 449 ms    Calculated P Axis 59 degrees    Calculated R Axis 37 degrees    Calculated T Axis 17 degrees    Diagnosis       Normal sinus rhythm  T wave abnormality, consider inferior ischemia  Abnormal ECG  When compared with ECG of 02-APR-2010 11:49,  No significant change was found  Confirmed by Yadi Alas MD., Asim ([id number]) on 8/1/2017 1:54:02 PM         Orders Placed This Encounter    OTHER     Sig: Tub seat     Dispense:  1 Units     Refill:  0    OTHER     Sig: Rolling walker with seat     Dispense:  1 Units     Refill:  0       1.  Acute CVA (cerebrovascular accident) [facility])        Follow-up Disposition:  Return if symptoms worsen or fail to improve. Post acute CVA. Little to no residual symptoms left and she is finsihing with OT next week. Continue this for rehab. Continue aspirin and lipitor for prevention of further strokes. Keep LDL below 70 per stroke guidelines as this is goal. They understand lifestyle changes and staying active. Continue to monitor symptoms. Understand the s/s of a stroke and when to call 911. Placard for DMV as she has trouble right now with long distances and needing to work back up to her original strength. Tub chair and rollator for balance per PT and stability.        This note will not be viewable in [employer]

## 2017-08-31 ENCOUNTER — HOME CARE VISIT (OUTPATIENT)
Dept: SCHEDULING | Facility: HOME HEALTH | Age: 82
End: 2017-08-31
Payer: COMMERCIAL

## 2017-08-31 VITALS
DIASTOLIC BLOOD PRESSURE: 72 MMHG | TEMPERATURE: 98.6 F | OXYGEN SATURATION: 96 % | HEART RATE: 76 BPM | SYSTOLIC BLOOD PRESSURE: 136 MMHG

## 2017-08-31 PROCEDURE — G0152 HHCP-SERV OF OT,EA 15 MIN: HCPCS

## 2017-10-05 ENCOUNTER — OFFICE VISIT (OUTPATIENT)
Dept: FAMILY MEDICINE CLINIC | Age: 82
End: 2017-10-05

## 2017-10-05 VITALS
BODY MASS INDEX: 24.59 KG/M2 | WEIGHT: 144 LBS | HEIGHT: 64 IN | SYSTOLIC BLOOD PRESSURE: 157 MMHG | RESPIRATION RATE: 18 BRPM | HEART RATE: 78 BPM | DIASTOLIC BLOOD PRESSURE: 60 MMHG | OXYGEN SATURATION: 96 % | TEMPERATURE: 97.9 F

## 2017-10-05 DIAGNOSIS — I10 HTN (HYPERTENSION), BENIGN: ICD-10-CM

## 2017-10-05 DIAGNOSIS — H53.8 BLURRY VISION, RIGHT EYE: ICD-10-CM

## 2017-10-05 DIAGNOSIS — E11.9 TYPE 2 DIABETES MELLITUS WITHOUT COMPLICATION, WITHOUT LONG-TERM CURRENT USE OF INSULIN (HCC): Primary | ICD-10-CM

## 2017-10-05 DIAGNOSIS — I63.9 ACUTE CVA (CEREBROVASCULAR ACCIDENT) (HCC): ICD-10-CM

## 2017-10-05 RX ORDER — ATORVASTATIN CALCIUM 10 MG/1
10 TABLET, FILM COATED ORAL
Qty: 30 TAB | Refills: 2 | Status: SHIPPED | OUTPATIENT
Start: 2017-10-05 | End: 2018-01-02 | Stop reason: SDUPTHER

## 2017-10-05 RX ORDER — GLIMEPIRIDE 2 MG/1
2 TABLET ORAL
Qty: 30 TAB | Refills: 2 | Status: SHIPPED | OUTPATIENT
Start: 2017-10-05 | End: 2018-01-02 | Stop reason: SDUPTHER

## 2017-10-05 RX ORDER — CANDESARTAN 32 MG/1
32 TABLET ORAL DAILY
Qty: 30 TAB | Refills: 2 | Status: SHIPPED | OUTPATIENT
Start: 2017-10-05 | End: 2018-01-02 | Stop reason: SDUPTHER

## 2017-10-05 RX ORDER — GUAIFENESIN 100 MG/5ML
81 LIQUID (ML) ORAL DAILY
Qty: 30 TAB | Refills: 2 | Status: SHIPPED | OUTPATIENT
Start: 2017-10-05 | End: 2018-01-02 | Stop reason: SDUPTHER

## 2017-10-05 NOTE — PROGRESS NOTES
Chief Complaint   Patient presents with    Hypertension    Diabetes    Medication Refill     1. Have you been to the ER, urgent care clinic since your last visit? Hospitalized since your last visit? No    2. Have you seen or consulted any other health care providers outside of the [address] [place] [address] since your last visit? Include any pap smears or colon screening.  No

## 2017-10-05 NOTE — PROGRESS NOTES
HPI       Demetrio Franco is a 80 y.o. female with hx of HTN, DMII, headaches, hx CVA 8/2017 who presents for medication refill and worsening unilateral blurry vision. Patient is Burundian-speaking only, has son with her who translates. They declined the use of a [employer]  phone. Patient was admitted to [facility] 8/1/17 - 8/2/17 for acute L CVA with residual R sided weakness. Was seen by Neuro while inpatient, who started ASA 81mg and statin. Reports she saw them once after discharge on 8/25, but has no further follow up appointments. Also states she had home health PT working with her after discharge but was cleared by them ~3 weeks ago and no longer requires physical therapy. Reports since discharge her R vision has been steadily declining with worsening blurry vision. Has never been to an eye doctor. Also reports her hearing has been decreasing in her R ear as well. Feels she has residual R sided weakness. No acute onset vision changes, has occasional mild headaches but no acutely severe headaches, no dizziness or lightheadedness. No falls. No changes in speech or trouble swallowing. Denies chest pain or SOB, no upper extremity pain, numbness or tingling, or acute worsening of strength. Also requires medication refills today, has only ~ 1 week left of medications. No other complaints at this time. PMHx:  Past Medical History:   Diagnosis Date    Acute CVA (cerebrovascular accident) ([facility] [address] 8/2/2017    L occipital lobe, Mid and posterior, L temporal lobe, and L thalamus    Diabetes ([facility]     Hypertension      Meds:   Current Outpatient Prescriptions   Medication Sig Dispense Refill    candesartan (ATACAND) 32 mg tablet Take 1 Tab by mouth daily. 30 Tab 2    glimepiride (AMARYL) 2 mg tablet Take 1 Tab by mouth every morning. 30 Tab 2    aspirin 81 mg chewable tablet Take 1 Tab by mouth daily. 30 Tab 2    atorvastatin (LIPITOR) 10 mg tablet Take 1 Tab by mouth nightly.  Indications: prevention of cerebrovascular accident 30 Tab 2    OTHER Tub seat 1 Units 0    OTHER Rolling walker with seat 1 Units 0    acetaminophen (TYLENOL) 325 mg tablet Take 650 mg by mouth every six (6) hours as needed for Pain. Allergies:   No Known Allergies    Smoker:  History   Smoking Status    Never Smoker   Smokeless Tobacco    Never Used     ETOH:   History   Alcohol Use No     FH:   Family History   Problem Relation Age of Onset    No Known Problems Mother     No Known Problems Father     No Known Problems Sister     No Known Problems Brother     No Known Problems Maternal Aunt     No Known Problems Maternal Uncle     No Known Problems Paternal Aunt     No Known Problems Paternal Uncle     No Known Problems Maternal Grandmother     No Known Problems Maternal Grandfather     No Known Problems Paternal Grandmother     No Known Problems Paternal Grandfather     No Known Problems Other      ROS:  Review of Systems   Constitutional: Negative for fatigue and fever. HENT: Negative. Negative for sinus pressure, sneezing and sore throat. Respiratory: Negative for cough and shortness of breath. Cardiovascular: Negative for chest pain and palpitations. Gastrointestinal: Negative for abdominal pain, constipation, diarrhea, nausea and vomiting. Endocrine: Negative for polyuria. Genitourinary: Negative for difficulty urinating, dysuria, urgency and vaginal discharge. Musculoskeletal:        R sided  strength weakness and RLE weakness   Neurological: Positive for headaches. Negative for dizziness and light-headedness.      Physical Exam:  Visit Vitals    /60    Pulse 78    Temp 97.9 °F (36.6 °C) (Oral)    Resp 18    Ht 5' 4\" (1.626 m)    Wt 144 lb (65.3 kg)    SpO2 96%    BMI 24.72 kg/m2       Wt Readings from Last 3 Encounters:   10/05/17 144 lb (65.3 kg)   08/24/17 144 lb (65.3 kg)   08/18/17 143 lb (64.9 kg)     BP Readings from Last 3 Encounters:   10/05/17 157/60 08/31/17 136/72   08/24/17 158/90      Physical Exam   Constitutional: She is oriented to person, place, and time. She appears well-developed and well-nourished. HENT:   Head: Normocephalic. Right Ear: External ear normal.   Left Ear: External ear normal.   Eyes: Conjunctivae and EOM are normal. Pupils are equal, round, and reactive to light. With R eye, patient able to identify shapes and fingers approximately 2-3 feet away with squinting. No field of vision deficits. With L eye, patient able to identify fingers >5-6 feet out without squinting. Neck: Neck supple. Cardiovascular: Normal rate, regular rhythm and normal heart sounds. Pulmonary/Chest: Effort normal and breath sounds normal.   Abdominal: Soft. Musculoskeletal: Normal range of motion. Neurological: She is alert and oriented to person, place, and time. 5/5 strength and sensation throughout upper and lower extremities.  strength intact and 5/5 bilaterally. No noticeable unilateral weakness despite patient's subjective complaints   Vitals reviewed. No results found for this or any previous visit (from the past 12 hour(s)). Assessment     80 y.o. female with:    ICD-10-CM ICD-9-CM    1. Type 2 diabetes mellitus without complication, without long-term current use of insulin ([facility]) E11.9 250.00 glimepiride (AMARYL) 2 mg tablet   2. Acute [facility]cerebrovascular [facility]) [facility] I63.9 434.91 REFERRAL TO NEUROLOGY      aspirin 81 mg chewable tablet      atorvastatin (LIPITOR) 10 mg tablet   3. HTN (hypertension), benign I10 401.1 candesartan (ATACAND) 32 mg tablet   4.  Blurry vision, right eye H53.8 368.8 REFERRAL TO NEUROLOGY      REFERRAL TO OPTOMETRY              Plan       Orders Placed This Encounter    REFERRAL TO NEUROLOGY    REFERRAL TO OPTOMETRY    candesartan (ATACAND) 32 mg tablet    glimepiride (AMARYL) 2 mg tablet    aspirin 81 mg chewable tablet    atorvastatin (LIPITOR) 10 mg tablet     Slowly worsening vision changes s/p R CVA in 8/2017  - Seen by Rory Mott NP on 8/24/17 for follow up who ordered rolling walker and tub seat but unclear if patient was to follow up   - Referred patient back to Dr. Dean Winters (who saw patient while inpatient) for worsening vision changes as sequelae of CVA and for continuity of care  - Referred patient to optometry  - Refilled atorvastatin and aspirin for stroke prevention    HTN - /60 today, as noted in Dr. Gonzáles Brine note from 8/18, BP is slightly above goal but stable, patient is 80year old with BP in 150s/70s but occasionally in 130s/70s, asymptomatic. Did not add additional antihypertensive as we did not want to increase incidents of hypotension and chances of falls. - refilled candesartan     Diabetes  - A1C 6.7 on 8/1/17, at goal.   - refilled glimepiride        Patient asked to follow up in 3 months for BP check and to monitor vision changes and post-CVA sequelae. Patient discussed and seen with Dr. Smiley    I have discussed the diagnosis with the patient and the intended plan as seen in the above orders. The patient has received an after-visit summary and questions were answered concerning future plans. I have discussed medication side effects and warnings with the patient as well.     Amado Nash MD  Family Medicine Resident

## 2017-10-05 NOTE — MR AVS SNAPSHOT
Visit Information Jimmy Ryan y Arianne Personal Médico Departamento Teléfono del Dep. Número de visita 10/5/2017  3:00 PM Filiberto Stewart, Thomas Dhillon [phone] Upcoming Health Maintenance Date Due  
 FOOT EXAM Q1 11/3/1936 MICROALBUMIN Q1 11/3/1936 EYE EXAM RETINAL OR DILATED Q1 11/3/1936 DTaP/Tdap/Td series (1 - Tdap) 11/3/1947 ZOSTER VACCINE AGE 60> 9/3/1986 GLAUCOMA SCREENING Q2Y 11/3/1991 OSTEOPOROSIS SCREENING (DEXA) 11/3/1991 Pneumococcal 65+ Low/Medium Risk (1 of 2 - PCV13) 11/3/1991 MEDICARE YEARLY EXAM 11/3/1991 INFLUENZA AGE 9 TO ADULT 8/1/2017 HEMOGLOBIN A1C Q6M 2/1/2018 LIPID PANEL Q1 8/2/2018 Alerg[place]  Review Complete El: 10/5/2017 Por: Edwin Maya LPN A partir del:  10/5/2017 No Known Allergies Vacunas actuales Marisa Krish No hay ninguna vacuna archivada. No revisadas esta visita You Were Diagnosed With   
  
 [facility] Type 2 diabetes mellitus without complication, without long-term current use of insulin (HCC)    -  Primary ICD-10-CM: E11.9 ICD-9-CM: 250.00 Acute CVA (cerebrovascular accident) ([address]     ICD-10-CM: I63.9 ICD-9-CM: 434.91   
 HTN (hypertension), benign     ICD-10-CM: I10 
ICD-9-CM: 401.1 Blurry vision, right eye     ICD-10-CM: H53.8 ICD-9-CM: 368.8 Partes vitales PS Pulso Temperatura Resp Imogene ( percentil de crecimiento) Peso (percentil de crecimiento) 157/60 78 97.9 °F (36.6 °C) (Oral) 18 5' 4\" (1.626 m) 144 lb (65.3 kg) SpO2 BMI (IMC) Estado obstétrico Estatus de tabaquísmo 96% 24.72 kg/m2 Postmenopausal Never Smoker BMI and BSA Data Body Mass Index Body Surface Area 24.72 kg/m 2 1.72 m 2 [facility] Name Phone [address]  AT [address] [place] [address]. [phone] Deshpande lista de medicamentos actualizada Lista actualizada el: 10/5/17  3:44 PM.  Rajesh Wilhelm use deshpande lista de medicamentos más reciente. acetaminophen 325 mg tablet También conocido jaswinder:  TYLENOL Take 650 mg by mouth every six (6) hours as needed for Pain. aspirin 81 mg chewable tablet Take 1 Tab by mouth daily. atorvastatin 10 mg tablet También conocido jaswinder:  LIPITOR Take 1 Tab by mouth nightly. Indications: prevention of cerebrovascular accident  
  
 candesartan 32 mg tablet También conocido jaswinder:  ATACAND Take 1 Tab by mouth daily. glimepiride 2 mg tablet También conocido jaswinder:  AMARYL Take 1 Tab by mouth every morning. * OTHER Tub seat * OTHER Rolling walker with seat * Aviso:  Esta lista contiene medicamentos que son iguales a otros medicamentos recetados para usted. Celestina las instrucciones con cuidado y pida a deshpande personal médico que revise la lista de medicamentos y las instrucciones correspondientes con usted. Impresion de recetas Refills  
 candesartan (ATACAND) 32 mg tablet 2 Sig: Take 1 Tab by mouth daily. Class: Print Route: Oral  
 glimepiride (AMARYL) 2 mg tablet 2 Sig: Take 1 Tab by mouth every morning. Class: Print Route: Oral  
 aspirin 81 mg chewable tablet 2 Sig: Take 1 Tab by mouth daily. Class: Print Route: Oral  
 atorvastatin (LIPITOR) 10 mg tablet 2 Sig: Take 1 Tab by mouth nightly. Indications: prevention of cerebrovascular accident Class: Print Route: Oral  
  
Hicimos lo siguiente REFERRAL TO NEUROLOGY [BFR79 Custom] Comentarios:  
 Worsening R vision/hearing since stroke 8/2017. Pt states they have had outpatient neurology follow up but do not know with whom and we do not see any in the system. This patient was seen by Dr. Nereida Ballard while inpatient. REFERRAL TO OPTOMETRY [id number] Custom] Comentarios:  
 Blurry vision since stroke 8/2017 Informacion de [employer] de Referencia Referido por Referido a  
  
 [id number], [address] [facility], [address] [facility] [address] , [address] [facility] Phone: [phone] Fax: [phone] Visitas Estado Fecha de inicio Mansi Ades final  
 1 New Request 10/5/17 10/5/18 Si deshpande referencia tiene un estado de \"pending review\" o \"denied\" , informacion adicional sera enviada para apoyar el resultado de esta decision. Codigo de Referencia Referido por Referido a  
 [id number] [place] [address] Phone: [phone] Fax: [phone] Visitas Estado Fecha de inicio Mansi Ades final  
 1 New Request 10/5/17 10/5/18 Si deshpande referencia tiene un estado de \"pending review\" o \"denied\" , informacion adicional sera enviada para apoyar el resultado de esta decision. Introducing [facility]! [facility] introduce portal paciente MyChart . Ahora se puede acceder a partes de deshpande expediente médico, enviar por correo electrónico la oficina de deshpande médico y solicitar renovaciones de medicamentos en línea. En deshpande navegador de Internet , Lorena Goodwin a [email or web address] Moris clic en el usuario por Mona Solis? Rosendo Byron clic aquí en la sesión Lise Favre. Verá la página de registro [place]. Ingrese deshpande código de [employer] anais y jaswinder aparece a continuación. Usted no tendrá que [employer] código después de elver completado el proceso de registro . Si usted no se inscribe antes de la fecha de caducidad , debe solicitar un nuevo código. · MyChart Código de acceso : [id number] Expires: 10/31/2017  1:42 PM 
 
Ingresa los últimos cuatro dígitos de deshpande Número de Seguro Social ( xxxx ) y fecha de nacimiento ( dd / mm / aaaa ) jaswinder se indica y moris clic en Enviar. Usted será llevado a la siguiente página de registro . Crear un ID MyChart . Esta será deshpande ID de inicio de sesión de MyChart y no puede ser [place] , por lo que pensar en yvonne que es [place] y fácil de recordar . Crear yvonne contraseña MyChart . Usted puede cambiar deshpande contraseña en cualquier momento . Ingrese deshpande Password Reset de preguntas y [place] . [place] se puede utilizar en un momento posterior si usted olvida deshpande contraseña. Introduzca deshpande dirección de correo electrónico . Facundo Shipman recibirá yvonne notificación por correo electrónico cuando la nueva información está disponible en MyChart . Catrina Cowing clic en Registrarse. Alpha Jose Luis ash y descargar porciones de deshpande expediente médico. 
George clic en el enlace de descarga del menú Resumen para descargar yvonne copia portátil de deshpande información médica . Si tiene [employer] , por favor visite la sección de preguntas frecuentes del sitio web MyChart . Recuerde, MyChart NO es que se utilizará para las necesidades urgentes. Para emergencias médicas , llame al 911 . Ahora disponible en deshpande iPhone y Android ! Por favor proporcione jonn resumen de la documentación de cuidado a deshpande próximo proveedor. Your primary care clinician is listed as Justina Arora. If you have any questions after today's visit, please call [phone].

## 2017-10-16 NOTE — PROGRESS NOTES
I reviewed with the resident the medical history and the resident's findings on the physical examination. I discussed with the resident the patient's diagnosis and concur with the plan.   Pt seen and examined with dr Eric Miller

## 2018-01-02 ENCOUNTER — OFFICE VISIT (OUTPATIENT)
Dept: FAMILY MEDICINE CLINIC | Age: 83
End: 2018-01-02

## 2018-01-02 VITALS
RESPIRATION RATE: 18 BRPM | WEIGHT: 149 LBS | HEART RATE: 74 BPM | DIASTOLIC BLOOD PRESSURE: 64 MMHG | BODY MASS INDEX: 25.44 KG/M2 | SYSTOLIC BLOOD PRESSURE: 133 MMHG | OXYGEN SATURATION: 100 % | TEMPERATURE: 98.5 F | HEIGHT: 64 IN

## 2018-01-02 DIAGNOSIS — M25.649 STIFFNESS OF HAND JOINT, UNSPECIFIED LATERALITY: ICD-10-CM

## 2018-01-02 DIAGNOSIS — I10 HTN (HYPERTENSION), BENIGN: ICD-10-CM

## 2018-01-02 DIAGNOSIS — E11.9 TYPE 2 DIABETES MELLITUS WITHOUT COMPLICATION, WITHOUT LONG-TERM CURRENT USE OF INSULIN (HCC): Primary | ICD-10-CM

## 2018-01-02 DIAGNOSIS — I63.9 ACUTE CVA (CEREBROVASCULAR ACCIDENT) (HCC): ICD-10-CM

## 2018-01-02 RX ORDER — ATORVASTATIN CALCIUM 10 MG/1
10 TABLET, FILM COATED ORAL
Qty: 90 TAB | Refills: 1 | Status: SHIPPED | OUTPATIENT
Start: 2018-01-02

## 2018-01-02 RX ORDER — GLIMEPIRIDE 2 MG/1
2 TABLET ORAL
Qty: 90 TAB | Refills: 1 | Status: SHIPPED | OUTPATIENT
Start: 2018-01-02

## 2018-01-02 RX ORDER — GUAIFENESIN 100 MG/5ML
81 LIQUID (ML) ORAL DAILY
Qty: 90 TAB | Refills: 1 | Status: SHIPPED | OUTPATIENT
Start: 2018-01-02

## 2018-01-02 RX ORDER — MELOXICAM 7.5 MG/1
7.5 TABLET ORAL DAILY
Qty: 14 TAB | Refills: 0 | Status: SHIPPED | OUTPATIENT
Start: 2018-01-02

## 2018-01-02 RX ORDER — CANDESARTAN 32 MG/1
32 TABLET ORAL DAILY
Qty: 90 TAB | Refills: 1 | Status: SHIPPED | OUTPATIENT
Start: 2018-01-02

## 2018-01-02 RX ORDER — DICLOFENAC SODIUM 10 MG/G
2 GEL TOPICAL 4 TIMES DAILY
Qty: 2 EACH | Refills: 1 | Status: SHIPPED | OUTPATIENT
Start: 2018-01-02

## 2018-01-02 NOTE — PATIENT INSTRUCTIONS
Osteoartritis: Instrucciones de cuidado - [ Osteoarthritis: Care Instructions ]  Instrucciones de cuidado    La artritis es un problema de bella común que se caracteriza por la inflamación de las articulaciones. Existen varios tipos de artritis. La osteoartritis es causada por el desgaste del cartílago, el tejido tj y grueso que amortigua las articulaciones. Causa dolor, rigidez e hinchazón, con frecuencia en la columna vertebral, los dedos, las caderas y las rodillas. La osteoartritis puede presentarse a cualquier edad, mimi es más común en [employer]. La osteoartritis nunca desaparece completamente, mimi puede controlarse. Los medicamentos y el tratamiento en el hogar pueden reducir el dolor y prevenir que la artritis empeore. La atención de seguimiento es yvonne parte clave de deshpande tratamiento y seguridad. Asegúrese de hacer y acudir a todas las citas, y llame a deshpande médico si está teniendo problemas. También es yvonne buena idea saber los resultados de los exámenes y mantener yvonne lista de los medicamentos que maryam. Cómo puede cuidarse en el hogar? · Para aliviar la rigidez, tome yvonne ducha o un baño tibio en las [place]. Después de esto, evite quedarse quieto. · Si la articulación no está hinchada, aplique calor húmedo, por ejemplo yvonne toalla mojada y tibia, por entre 20 y 30 minutos, 2 o 3 veces al día. No use calor en yvonne articulación hinchada. · Si la articulación está hinchada, use hielo o compresas frías por entre 10 y 20 minutos, yvonne vez cada hora. El frío ayudará a aliviar el dolor y a reducir la inflamación. Póngase un paño robles entre el hielo y la piel. · Para prevenir la rigidez, mueva suavemente la articulación en toda deshpande amplitud de movimiento varias veces al día. · Si la articulación le duele, evite las actividades que la sobrecarguen ervin unos días. [place] Perfect [employer]&Kuponjo día. · George ejercicio de Aure regular.  Caminar, nadar, practicar yoga, conor chi, montar en bicicleta y hacer ejercicios aeróbicos en el agua son Leticia Gibson y [address] articulaciones. · [place] y [place] un peso saludable. Si necesita bajar de peso o [place], [place] ejercicio hecho con regularidad y Timoteo Liner dieta blair le ayudarán. El sobrepeso puede sobrecargar las articulaciones, especialmente las rodillas y [de-identified], y empeorar el dolor. Bajar incluso unas pocas libras podría ayudar. · [employer] analgésicos (medicamentos para el dolor) exactamente según las indicaciones. ¨ Si el médico le recetó un analgésico, tómelo según las indicaciones. ¨ Si no está tomando un analgésico recetado, pregúntele a deshpande médico si puede anushka joan de [employer]. Cuándo debe pedir ayuda? Llame a deshpande médico ahora mismo o busque atención médica inmediata si:  ? · Tiene un dolor tan intenso que no puede usar la articulación. ? · Tiene un dolor repentino de espalda con debilitamiento en las piernas o pérdida del control de los intestinos o la vejiga. ? · Omayra heces son negruzcas y parecidas al alquitrán o tienen rastros de Andreafski. ? · Tiene dolor e hinchazón intensos en más de yvonne articulación. ?Preste especial atención a los cambios en deshpande bella y asegúrese de comunicarse con deshpande médico si:  ? · Tiene efectos secundarios por los medicamentos, jaswinder dolor abdominal, acidez estomacal continua o náuseas. ? · El dolor en las articulaciones dura más de 6 semanas, y el tratamiento en el hogar no ayuda. Dónde puede encontrar más información en inglés? Sami Pope a [email or web address] Harrie Phoenix A872 en la búsqueda para aprender más acerca de \"Osteoartritis: Instrucciones de cuidado - [ Osteoarthritis: Care Instructions ]. \"  Revisado: 31 octubre, 2016  Versión del contenido: 11.4  © 4534-7636 Healthwise, edulio. Las instrucciones de cuidado fueron adaptadas bajo licencia por [employer] (which disclaims liability or warranty for this information).  Si usted tiene [place] afección médica o sobre estas instrucciones, siempre pregunte a deshpande profesional de bella. [facility] niega toda garantía o responsabilidad por deshpande uso de esta información.

## 2018-01-02 NOTE — PROGRESS NOTES
Assessment / Plan   Diagnoses and all orders for this visit:    1. Type 2 diabetes mellitus without complication, without long-term current use of insulin (HCC)  -     glimepiride (AMARYL) 2 mg tablet; Take 1 Tab by mouth every morning. 2. HTN (hypertension), benign  -     candesartan (ATACAND) 32 mg tablet; Take 1 Tab by mouth daily. 3. Stiffness of hand joint, unspecified laterality  -     meloxicam (MOBIC) 7.5 mg tablet; Take 1 Tab by mouth daily. -     diclofenac (VOLTAREN) 1 % gel; Apply 2 g to affected area four (4) times daily. 4. Acute CVA (cerebrovascular accident) ([facility]  -     atorvastatin (LIPITOR) 10 mg tablet; Take 1 Tab by mouth nightly. Indications: prevention of cerebrovascular accident  -     aspirin 81 mg chewable tablet; Take 1 Tab by mouth daily. 1- Refilled hypoglycemic agent. 2- BP stable. Refilled Atacand  3- Probably OA due to age and description of symptoms. Gave a short course of Mobic due to age and gave her topical Voltaren to help with inflammation  4- Refilled Lipitor and ASA    Follow-up Disposition:  Return in about 3 months (around 4/2/2018). I have discussed the diagnosis with the patient and the intended plan as seen in the above orders. The patient has received an after-visit summary and questions were answered concerning future plans. I have discussed medication side effects and warnings with the patient as well. Anette Reaves MD  Family Medicine Resident       HPI     Chief Complaint   Patient presents with    Diabetes    Hypertension    Medication Refill     Atacand, Amaryl, lipitor     She is a 80 y.o. female who presents for BP and diabetes check up. Hypertension: No issues at the moment  Diabetes: No issues. Had opthalmology visit recently. Glasses changed but still complaints of decreased vision after CVA. No symptoms of hypoglycemia.     She reports stiffness in both hands every morning and pain throughout the day that has gotten worse over the last couple of months. Review of Systems   Constitutional: Negative. HENT: Negative. Eyes:        Decreased vision    Cardiovascular: Negative. Gastrointestinal: Negative. Genitourinary: Negative. Musculoskeletal:        Joint stiffness    Skin: Negative. Neurological: Negative. Psychiatric/Behavioral: Negative. Reviewed PmHx, RxHx, FmHx, SocHx, AllgHx and updated and dated in the chart. Physical Exam:  Visit Vitals    /64    Pulse 74    Temp 98.5 °F (36.9 °C) (Oral)    Resp 18    Ht 5' 4\" (1.626 m)    Wt 149 lb (67.6 kg)    SpO2 100%    BMI 25.58 kg/m2     Physical Exam   Constitutional: She is oriented to person, place, and time. Cardiovascular: Normal rate and regular rhythm. Pulmonary/Chest: Effort normal and breath sounds normal.   Musculoskeletal: Normal range of motion. Legs:       Right foot: There is no deformity. Left foot: There is no deformity. Feet:   Right Foot:   Protective Sensation: 10 sites tested. 6 sites sensed. Skin Integrity: Negative for ulcer. Left Foot:   Protective Sensation: 10 sites tested. 5 sites sensed. Skin Integrity: Negative for ulcer. Neurological: She is alert and oriented to person, place, and time. Skin: Skin is warm and dry. Psychiatric: She has a normal mood and affect.

## 2018-01-02 NOTE — PROGRESS NOTES
Chief Complaint   Patient presents with    Diabetes    Hypertension    Medication Refill     Atacand, Amaryl, lipitor

## 2018-01-02 NOTE — MR AVS SNAPSHOT
Visit Information Michelle Mata Personal Médico Departamento Teléfono del Dep. Número de visita 1/2/2018  3:15 PM Renetta Jones, Thomas Dhillon [phone] [id number] Follow-up Instructions Return in about 3 months (around 4/2/2018). Upcoming Health Maintenance Date Due  
 FOOT EXAM Q1 11/3/1936 MICROALBUMIN Q1 11/3/1936 EYE EXAM RETINAL OR DILATED Q1 11/3/1936 DTaP/Tdap/Td series (1 - Tdap) 11/3/1947 ZOSTER VACCINE AGE 60> 9/3/1986 GLAUCOMA SCREENING Q2Y 11/3/1991 OSTEOPOROSIS SCREENING (DEXA) 11/3/1991 Pneumococcal 65+ Low/Medium Risk (1 of 2 - PCV13) 11/3/1991 MEDICARE YEARLY EXAM 11/3/1991 Influenza Age 5 to Adult 8/1/2017 HEMOGLOBIN A1C Q6M 2/1/2018 LIPID PANEL Q1 8/2/2018 Alergias  Review Complete El: 1/2/2018 Por: Jh Day LPN A partir del:  1/2/2018 No Known Allergies Vacunas actuales Rexine Smart No hay ninguna vacuna archivada. No revisadas esta visita You Were Diagnosed With   
  
 Payton Franco Type 2 diabetes mellitus without complication, without long-term current use of insulin (HCC)    -  Primary ICD-10-CM: E11.9 ICD-9-CM: 250.00 HTN (hypertension), benign     ICD-10-CM: I10 
ICD-9-CM: 401.1 Stiffness of hand joint, unspecified laterality     ICD-10-CM: M25.649 ICD-9-CM: 719.54 Acute CVA (cerebrovascular accident) ([address]     ICD-10-CM: I63.9 ICD-9-CM: 434.91 Partes vitales PS Pulso Temperatura Resp [place] ( percentil de crecimiento) Peso (percentil de crecimiento)  
 133/64 74 98.5 °F (36.9 °C) (Oral) 18 5' 4\" (1.626 m) 149 lb (67.6 kg) SpO2 BMI (IMC) Estado obstétrico Estatus de tabaquísmo 100% 25.58 kg/m2 Postmenopausal Never Smoker Historial de signos vitales BMI and BSA Data Body Mass Index Body Surface Area 25.58 kg/m 2 1.75 m 2 [facility] Name Phone [address] AT [address] [place] [address] Road. [phone] Deshpande lista de medicamentos actualizada Lista actualizada el: 1/2/18  4:03 PM.  Tala Bains use deshpande lista de medicamentos más reciente. acetaminophen 325 mg tablet También conocido jaswinder:  TYLENOL Take 650 mg by mouth every six (6) hours as needed for Pain. aspirin 81 mg chewable tablet Take 1 Tab by mouth daily. atorvastatin 10 mg tablet También conocido jaswinder:  LIPITOR Take 1 Tab by mouth nightly. Indications: prevention of cerebrovascular accident  
  
 candesartan 32 mg tablet También conocido jaswinder:  ATACAND Take 1 Tab by mouth daily. diclofenac 1 % Gel También conocido jaswinder:  VOLTAREN Apply 2 g to affected area four (4) times daily. glimepiride 2 mg tablet También conocido jaswinder:  AMARYL Take 1 Tab by mouth every morning. meloxicam 7.5 mg tablet También conocido jaswinder:  MOBIC Take 1 Tab by mouth daily. * OTHER Tub seat * OTHER Rolling walker with seat * Aviso:  Esta lista contiene medicamentos que son iguales a otros medicamentos recetados para usted. Celestina las instrucciones con cuidado y pida a deshpande personal médico que revise la lista de medicamentos y las instrucciones correspondientes con usted. Recetas Enviado a la [place] Refills  
 atorvastatin (LIPITOR) 10 mg tablet 1 Sig: Take 1 Tab by mouth nightly. Indications: prevention of cerebrovascular accident Class: Normal  
 Pharmacy: [facility] [address] [place] [address] AT [address] [place] [address] Road. Ph #: [phone] Route: Oral  
 candesartan (ATACAND) 32 mg tablet 1 Sig: Take 1 Tab by mouth daily. Class: Normal  
 Pharmacy: [facility] [address] [place] [address] RD AT [address] [place] [address] Road. Ph #: [phone]  Route: Oral  
 glimepiride (AMARYL) 2 mg tablet 1  
 Sig: Take 1 Tab by mouth every morning. Class: Normal  
 Pharmacy: [facility] [address] [place] [address], [address] [place] [address] AT [address] [place] [address] Road. Ph #: [phone] Route: Oral  
 aspirin 81 mg chewable tablet 1 Sig: Take 1 Tab by mouth daily. Class: Normal  
 Pharmacy: [facility] [address] [place] [address], [address] [place] [address] AT [address] [place] [address] Road. Ph #: [phone] Route: Oral  
 meloxicam (MOBIC) 7.5 mg tablet 0 Sig: Take 1 Tab by mouth daily. Class: Normal  
 Pharmacy: [facility] [address] [place] [address], [address] [place] [address] AT [address] [place] [address] Road. Ph #: [phone] Route: Oral  
 diclofenac (VOLTAREN) 1 % gel 1 Sig: Apply 2 g to affected area four (4) times daily. Class: Normal  
 Pharmacy: [facility] [address] AT [address] [place] [address] Road. Ph #: [phone] Route: Topical  
  
Instrucciones de seguimiento Return in about 3 months (around 4/2/2018). Instrucciones para el Paciente Osteoartritis: Instrucciones de cuidado - [ Osteoarthritis: Care Instructions ] Instrucciones de cuidado La artritis es un problema de bella común que se caracteriza por la inflamación de las articulaciones. Existen varios tipos de artritis. La osteoartritis es causada por el desgaste del cartílago, el tejido tj y grueso que amortigua las articulaciones. Causa dolor, rigidez e hinchazón, con frecuencia en la columna vertebral, los dedos, las caderas y las rodillas. La osteoartritis puede presentarse a cualquier edad, mimi es más común en [place]. La osteoartritis nunca desaparece completamente, mimi puede controlarse. Los medicamentos y el tratamiento en el hogar pueden reducir el dolor y prevenir que la artritis empeore. La atención de seguimiento es yvonne parte clave de deshpande tratamiento y seguridad.  Asegúrese de hacer y acudir a todas las citas, y llame a deshpande médico si está teniendo problemas. También es yvonne buena idea saber los resultados de los exámenes y mantener yvonne lista de los medicamentos que maryam. Cómo puede cuidarse en el hogar? · Para aliviar la rigidez, tome yvonne ducha o un baño tibio en las [place]. Después de esto, evite quedarse quieto. · Si la articulación no está hinchada, aplique calor húmedo, por ejemplo yvonne toalla mojada y tibia, por entre 20 y 30 minutos, 2 o 3 veces al día. No use calor en yvonne articulación hinchada. · Si la articulación está hinchada, use hielo o compresas frías por entre 10 y 20 minutos, yvonne vez cada hora. El frío ayudará a aliviar el dolor y a reducir la inflamación. Póngase un paño robles entre el hielo y la piel. · Para prevenir la rigidez, mueva suavemente la articulación en toda deshpande amplitud de movimiento varias veces al día. · Si la articulación le duele, evite las actividades que la sobrecarguen ervin unos días. [place] [employer] día. · George ejercicio de Aure regular. Caminar, nadar, practicar yoga, conor chi, montar en bicicleta y hacer ejercicios aeróbicos en el agua son Savanna Au y [address] articulaciones. · [place] y [place] un peso saludable. Si necesita bajar de peso o [place], [place] ejercicio hecho con regularidad y [facility] dieta blair le ayudarán. El sobrepeso puede sobrecargar las articulaciones, especialmente las rodillas y [de-identified], y empeorar el dolor. Bajar incluso unas pocas libras podría ayudar. · [employer] analgésicos (medicamentos para el dolor) exactamente según las indicaciones. ¨ Si el médico le recetó un analgésico, tómelo según las indicaciones. ¨ Si no está tomando un analgésico recetado, pregúntele a deshpande médico si puede anushka joan de [address] Cuándo debe pedir ayuda? Llame a deshpande médico ahora mismo o busque atención médica inmediata si: 
? · Tiene un dolor tan intenso que no puede usar la articulación. ? · Tiene un dolor repentino de espalda con debilitamiento en las piernas o pérdida del control de los intestinos o la vejiga. ? · Omayra heces son negruzcas y parecidas al alquitrán o tienen rastros de [place]. ? · Tiene dolor e hinchazón intensos en más de yvonne articulación. ?Preste especial atención a los cambios en deshpande bella y asegúrese de comunicarse con deshpande médico si: 
? · Tiene efectos secundarios por los medicamentos, jaswinder dolor abdominal, acidez estomacal continua o náuseas. ? · El dolor en las articulaciones dura más de 6 semanas, y el tratamiento en el hogar no ayuda. Dónde puede encontrar más información en inglés? Kelvin Husain a [email or web address] [facility] B029 en la búsqueda para aprender más acerca de \"Osteoartritis: Instrucciones de cuidado - [ Osteoarthritis: Care Instructions ]. \" 
Revisado: 31 octubre, 2016 Versión del contenido: 11.4 © 2006-4664 Healthwise, Incorporated. Las instrucciones de cuidado fueron adaptadas bajo licencia por [employer] (which disclaims liability or warranty for this information). Si usted tiene [place] afección médica o sobre estas instrucciones, siempre pregunte a deshpande profesional de bella. Healthwise, Incorporated niega toda garantía o responsabilidad por deshpande uso de esta información. Introducing [address] [facility] introduce portal paciente MyChart . Ahora se puede acceder a partes de deshpande expediente médico, enviar por correo electrónico la oficina de deshpande médico y solicitar renovaciones de medicamentos en línea. En deshpande navegador de Internet , Jemma Dai a [email or web address] George clic en el usuario por Dangelo Rodriguez? Jamshid Fuse clic aquí en la sesión Reymundo Arteaga. Verá la página de registro [place]. Ingrese deshpande código de [facility] anais y jaswinder aparece a continuación.  Usted no tendrá que [employer] código después de elver completado el proceso de registro . Si usted no se inscribe antes de la fecha de caducidad , debe solicitar un nuevo código. · MyChart Código de acceso : [id number] Expires: 4/2/2018  3:26 PM 
 
Ingresa los últimos cuatro dígitos de deshpande Número de Seguro Social ( xxxx ) y fecha de nacimiento ( dd / mm / aaaa ) jaswinder se indica y george clic en Enviar. Usted será llevado a la siguiente página de registro . Crear un ID MyChart . Esta será deshpande ID de inicio de sesión de MyChart y no puede ser [place] , por lo que pensar en yvonne que es Roshan Curt y fácil de recordar . Crear yvonne contraseña MyChart . Usted puede cambiar deshpande contraseña en cualquier momento . Ingrese deshpande Password Reset de preguntas y [place] . [place] se puede utilizar en un momento posterior si usted olvida deshpande contraseña. Introduzca deshpande dirección de correo electrónico . Ana Maria Dubois recibirá yvonne notificación por correo electrónico cuando la nueva información está disponible en MyChart . Chuy Blow clic en Registrarse. Littie Chi ash y descargar porciones de deshpande expediente médico. 
George clic en el enlace de descarga del menú Resumen para descargar yvonne copia portátil de deshpande información médica . Si tiene [employer] , por favor visite la sección de preguntas frecuentes del sitio web MyChart . Recuerde, MyChart NO es que se utilizará para las necesidades urgentes. Para emergencias médicas , llame al 911 . Ahora disponible en deshpande iPhone y Android ! Por favor proporcione jonn resumen de la documentación de cuidado a deshpande próximo proveedor. Your primary care clinician is listed as Joaquim Rendon. If you have any questions after today's visit, please call [phone].

## 2018-04-03 ENCOUNTER — HOSPITAL ENCOUNTER (EMERGENCY)
Age: 83
Discharge: HOME | End: 2018-04-03
Attending: EMERGENCY MEDICINE
Payer: SELF-PAY

## 2018-04-03 ENCOUNTER — APPOINTMENT (OUTPATIENT)
Dept: GENERAL RADIOLOGY | Age: 83
End: 2018-04-03
Attending: EMERGENCY MEDICINE
Payer: SELF-PAY

## 2018-04-03 VITALS
WEIGHT: 160 LBS | TEMPERATURE: 98.2 F | RESPIRATION RATE: 16 BRPM | DIASTOLIC BLOOD PRESSURE: 70 MMHG | OXYGEN SATURATION: 97 % | SYSTOLIC BLOOD PRESSURE: 142 MMHG | HEART RATE: 70 BPM | HEIGHT: 63 IN | BODY MASS INDEX: 28.35 KG/M2

## 2018-04-03 DIAGNOSIS — R07.9 CHEST PAIN, UNSPECIFIED TYPE: Primary | ICD-10-CM

## 2018-04-03 LAB
ANION GAP SERPL CALC-SCNC: 11 MMOL/L (ref 5–15)
BASOPHILS # BLD: 0 K/UL (ref 0–0.1)
BASOPHILS NFR BLD: 0 % (ref 0–1)
BUN SERPL-MCNC: 15 MG/DL (ref 6–20)
BUN/CREAT SERPL: 23 (ref 12–20)
CALCIUM SERPL-MCNC: 8.9 MG/DL (ref 8.5–10.1)
CHLORIDE SERPL-SCNC: 105 MMOL/L (ref 97–108)
CO2 SERPL-SCNC: 28 MMOL/L (ref 21–32)
CREAT SERPL-MCNC: 0.66 MG/DL (ref 0.55–1.02)
D DIMER PPP FEU-MCNC: 0.4 MG/L FEU (ref 0–0.65)
DIFFERENTIAL METHOD BLD: NORMAL
EOSINOPHIL # BLD: 0.1 K/UL (ref 0–0.4)
EOSINOPHIL NFR BLD: 1 % (ref 0–7)
ERYTHROCYTE [DISTWIDTH] IN BLOOD BY AUTOMATED COUNT: 12.7 % (ref 11.5–14.5)
GLUCOSE SERPL-MCNC: 130 MG/DL (ref 65–100)
HCT VFR BLD AUTO: 44 % (ref 35–47)
HGB BLD-MCNC: 14.2 G/DL (ref 11.5–16)
IMM GRANULOCYTES # BLD: 0 K/UL (ref 0–0.04)
IMM GRANULOCYTES NFR BLD AUTO: 0 % (ref 0–0.5)
LYMPHOCYTES # BLD: 1.8 K/UL (ref 0.8–3.5)
LYMPHOCYTES NFR BLD: 18 % (ref 12–49)
MAGNESIUM SERPL-MCNC: 2.2 MG/DL (ref 1.6–2.4)
MCH RBC QN AUTO: 29 PG (ref 26–34)
MCHC RBC AUTO-ENTMCNC: 32.3 G/DL (ref 30–36.5)
MCV RBC AUTO: 90 FL (ref 80–99)
MONOCYTES # BLD: 0.7 K/UL (ref 0–1)
MONOCYTES NFR BLD: 7 % (ref 5–13)
NEUTS SEG # BLD: 7.2 K/UL (ref 1.8–8)
NEUTS SEG NFR BLD: 73 % (ref 32–75)
NRBC # BLD: 0 K/UL (ref 0–0.01)
NRBC BLD-RTO: 0 PER 100 WBC
PLATELET # BLD AUTO: 291 K/UL (ref 150–400)
PMV BLD AUTO: 11.2 FL (ref 8.9–12.9)
POTASSIUM SERPL-SCNC: 4.5 MMOL/L (ref 3.5–5.1)
RBC # BLD AUTO: 4.89 M/UL (ref 3.8–5.2)
SODIUM SERPL-SCNC: 144 MMOL/L (ref 136–145)
TROPONIN I SERPL-MCNC: <0.04 NG/ML
TROPONIN I SERPL-MCNC: <0.04 NG/ML
WBC # BLD AUTO: 9.8 K/UL (ref 3.6–11)

## 2018-04-03 PROCEDURE — 99283 EMERGENCY DEPT VISIT LOW MDM: CPT

## 2018-04-03 PROCEDURE — 83735 ASSAY OF MAGNESIUM: CPT | Performed by: EMERGENCY MEDICINE

## 2018-04-03 PROCEDURE — 36415 COLL VENOUS BLD VENIPUNCTURE: CPT | Performed by: EMERGENCY MEDICINE

## 2018-04-03 PROCEDURE — 71046 X-RAY EXAM CHEST 2 VIEWS: CPT

## 2018-04-03 PROCEDURE — 84484 ASSAY OF TROPONIN QUANT: CPT | Performed by: EMERGENCY MEDICINE

## 2018-04-03 PROCEDURE — 80048 BASIC METABOLIC PNL TOTAL CA: CPT | Performed by: EMERGENCY MEDICINE

## 2018-04-03 PROCEDURE — 85025 COMPLETE CBC W/AUTO DIFF WBC: CPT | Performed by: EMERGENCY MEDICINE

## 2018-04-03 PROCEDURE — 93005 ELECTROCARDIOGRAM TRACING: CPT

## 2018-04-03 PROCEDURE — 85379 FIBRIN DEGRADATION QUANT: CPT | Performed by: EMERGENCY MEDICINE

## 2018-04-03 NOTE — ED PROVIDER NOTES
HPI Comments: 80 y.o. female with medical history significant for hypertension and diabetes who presents ambulatory from home accompanied by grandson with chief complaint of chest pain. Patient reports two day history of constant chest pain she describes as \"pressure. \" Patient notes chest pain is exacerbated with taking deep breaths. Patient reports accompanying headache, nausea, diaphoresis, slight lower extremity edema, and back pain. Patient reports that she does not have cardiologist. Pertinent negatives include shortness of breath. There are no other acute medical concerns at this time. Social hx: Denies tobacco use; denies alcohol use; denies illicit drug use  PCP: Thuan Whiting MD     Note written by Eric Lockett, as dictated by Letty Thomas. Keshav Penny MD 7:00 PM      The history is provided by the patient and a relative. No  was used. Past Medical History:   Diagnosis Date    Acute [facility]A (cerebro[facility]) ([facility] 8/2/2017    L occipital lobe, Mid and posterior, L temporal lobe, and L thalamus    Diabetes (HCC)     Hypertension        Past Surgical History:   Procedure Laterality Date    HX OTHER SURGICAL      tonsils         Family History:   Problem Relation Age of Onset    No Known Problems Mother     No Known Problems Father     No Known Problems Sister     No Known Problems Brother     No Known Problems Maternal Aunt     No Known Problems Maternal Uncle     No Known Problems Paternal Aunt     No Known Problems Paternal Uncle     No Known Problems Maternal Grandmother     No Known Problems Maternal Grandfather     No Known Problems Paternal Grandmother     No Known Problems Paternal Grandfather     No Known Problems Other        Social History     Social History    Marital status: SINGLE     Spouse name: N/A    Number of children: N/A    Years of education: N/A     Occupational History    Not on file.      Social History Main Topics  Smoking status: Never Smoker    Smokeless tobacco: Never Used    Alcohol use No    Drug use: No    Sexual activity: No     Other Topics Concern    Not on file     Social History Narrative         ALLERGIES: Review of patient's allergies indicates no known allergies. Review of Systems   Constitutional: Positive for diaphoresis. Negative for chills and fever. HENT: Negative for ear pain and sore throat. Eyes: Negative for pain. Respiratory: Negative for chest tightness and shortness of breath. Cardiovascular: Positive for chest pain and leg swelling. Gastrointestinal: Negative for abdominal pain, nausea and vomiting. Genitourinary: Negative for dysuria and flank pain. Musculoskeletal: Positive for back pain. Skin: Negative for rash. Neurological: Positive for headaches. All other systems reviewed and are negative. Vitals:    04/03/18 1804   BP: 142/70   Pulse: 70   Resp: 16   Temp: 98.2 °F (36.8 °C)   SpO2: 97%   Weight: 72.6 kg (160 lb)   Height: 5' 3\" (1.6 m)            Physical Exam   Constitutional: She appears well-developed and well-nourished. Elderly   HENT:   Head: Normocephalic and atraumatic. Mouth/Throat: Oropharynx is clear and moist.   Eyes: Conjunctivae and EOM are normal. Pupils are equal, round, and reactive to light. No scleral icterus. Neck: Neck supple. No tracheal deviation present. Cardiovascular: Normal rate, regular rhythm, normal heart sounds and intact distal pulses. Good radial pulses   Pulmonary/Chest: Effort normal and breath sounds normal. No respiratory distress. Abdominal: Soft. She exhibits no distension. There is no tenderness. There is no rebound and no guarding. Genitourinary:   Genitourinary Comments: deferred   Musculoskeletal: She exhibits no edema. Neurological: She is alert. Skin: Skin is warm and dry. Psychiatric: She has a normal mood and affect. Nursing note and vitals reviewed.   Note written by Sarah Kaye Eric, as dictated by Ludwig Swanson. Casey Javier MD 7:00 PM       MDM  Number of Diagnoses or Management Options  Chest pain, unspecified type:   Diagnosis management comments: 79 yo Urdu speaking female with h/o HTN, DM, CVA presents with chest pain, now relieved. 2 set of troponin negative. D-dimer negative. CXR unremarkable. EKG shows SR with PAC's at 79, normal intervals and axis, no ischemia or infarction. Plan to refer to Cardiology for further testing.  Return to the emergency department for new/ worsening symptoms or other concerns       Patient Progress  Patient progress: stable        ED Course       Procedures

## 2018-04-04 LAB
ATRIAL RATE: 79 BPM
CALCULATED P AXIS, ECG09: 48 DEGREES
CALCULATED R AXIS, ECG10: 23 DEGREES
CALCULATED T AXIS, ECG11: 25 DEGREES
DIAGNOSIS, 93000: NORMAL
P-R INTERVAL, ECG05: 166 MS
Q-T INTERVAL, ECG07: 384 MS
QRS DURATION, ECG06: 96 MS
QTC CALCULATION (BEZET), ECG08: 440 MS
VENTRICULAR RATE, ECG03: 79 BPM

## 2018-04-04 NOTE — DISCHARGE INSTRUCTIONS
Dolor de pecho: Instrucciones de cuidado - [ Chest Pain: Care Instructions ]  Instrucciones de cuidado    El dolor de pecho puede tener muchas causas. Algunas no son graves y mejorarán por sí solas en pocos días. Magdalena algunos tipos de dolor de pecho requieren más pruebas y [address] Es posible que deshpande médico le haya recomendado yvonne visita de seguimiento dentro de las 8 a 12 horas siguientes. Si no mejora, es posible que necesite [address] pruebas o [address] Aunque deshpande médico le haya dado de larry, es necesario que esté atento a cualquier problema que se presente. El médico le hizo un cuidadoso chequeo, magdalena a veces los problemas pueden aparecer posteriormente. Si tiene nuevos síntomas o éstos no mejoran, obtenga atención médica de inmediato. Si tiene dolor o presión en el pecho que empeora o es diferente y que dura más de 5 minutos, o se desmayó (perdió el conocimiento), llame al 911 o busque otra ayuda de emergencia de inmediato. Acudir a yvonne consulta médica es sólo un paso en deshpande tratamiento. Aunque se sienta mejor, todavía deberá hacer lo que deshpande médico le recomiende, jaswinder asistir a todas las visitas de seguimiento sugeridas y anushka los medicamentos exactamente [employer]	Inyo fueron indicados. Young le ayudará a recuperarse y prevenir problemas futuros. ¿Cómo puede cuidarse en el hogar? · Descanse hasta que se sienta mejor. · [employer] bill medicamentos exactamente jaswinder le fueron recetados. Llame a deshpande médico si jose estar teniendo problemas con deshpande medicamento. · No conduzca después de anushka un analgésico (medicamento para el dolor) recetado. ¿Cuándo debe pedir ayuda? Llame al 911 si:  ? · Se desmayó (perdió el conocimiento). ? · Tiene graves dificultades para respirar. ? · Tiene síntomas de un ataque al corazón. Estos podrían incluir:  ¨ Dolor o presión en el pecho, o yvonne sensación extraña en el pecho. ¨ Sudoración. ¨ Falta de aire. ¨ Náuseas o vómito.   ¨ Dolor, presión o yvonne sensación extraña en la espalda, el john, la mandíbula, la parte superior del abdomen o en joan o ambos hombros o brazos. ¨ Aturdimiento o debilidad repentina. ¨ Latidos del corazón rápidos o irregulares. Después de llamar al 911, es posible que el operador le diga que mastique 1 aspirina para adultos o de 2 a 4 aspirinas de dosis baja. Espere a yvonne ambulancia. No intente conducir usted mismo. ? Llame hoy a deshpande médico si:  ? · Tiene cualquier dificultad para respirar. ? · El dolor en el pecho empeora. ? · [employer] o aturdimiento, o que está a punto de [place]. ? · No mejora jaswinder se esperaba. ? · Tiene dolor de pecho nuevo o diferente. ¿Dónde puede encontrar más información en inglés? Ida Tay a [email or web address] Escriba A120 en la búsqueda para aprender más acerca de \"Dolor de pecho: Instrucciones de cuidado - [ Chest Pain: Care Instructions ]. \"  Revisado: 20 Karen Chow 2017  Versión del contenido: 11.4  © 8278-3615 Healthwise, Incorporated. Las instrucciones de cuidado fueron adaptadas bajo licencia por [employer] (which disclaims liability or warranty for this information). Si usted tiene [place] afección médica o sobre estas instrucciones, siempre pregunte a deshpande profesional de bella. Healthwise, Incorporated niega toda garantía o responsabilidad por deshpande uso de esta información.

## 2018-06-11 ENCOUNTER — OFFICE VISIT (OUTPATIENT)
Dept: FAMILY MEDICINE CLINIC | Age: 83
End: 2018-06-11

## 2018-06-11 VITALS
WEIGHT: 152 LBS | SYSTOLIC BLOOD PRESSURE: 162 MMHG | TEMPERATURE: 98.4 F | HEIGHT: 63 IN | DIASTOLIC BLOOD PRESSURE: 73 MMHG | HEART RATE: 73 BPM | BODY MASS INDEX: 26.93 KG/M2 | RESPIRATION RATE: 18 BRPM | OXYGEN SATURATION: 97 %

## 2018-06-11 DIAGNOSIS — S49.91XA INJURY OF RIGHT SHOULDER, INITIAL ENCOUNTER: Primary | ICD-10-CM

## 2018-06-11 NOTE — PROGRESS NOTES
History of Present Illness     Patient Identification  Porfirio logan a 80 y.o. female complains of pain in the right shoulder pain. Pt slipped 3 days ago and hit back of her shoulder. No head trauma, no LOC. Pain located along posterior shoulder and trapezius area. No numbness/tingling/weakness. Trying tylenol which helps. No previous injuries of shoulder. No night time shoulder pain. No changes in ADLs. Pain has improved since the injury. She would also like to get a handicap parking pass. She had a temporary parking pass after CVA last year. She still has difficulty walking distances. Past Medical History:   Diagnosis Date    Acute CVA (cerebrovascular accident) ([facility] [address] 8/2/2017    L occipital lobe, Mid and posterior, L temporal lobe, and L thalamus    Diabetes ([facility] [address]     Hypertension      Family History   Problem Relation Age of Onset    No Known Problems Mother     No Known Problems Father     No Known Problems Sister     No Known Problems Brother     No Known Problems Maternal Aunt     No Known Problems Maternal Uncle     No Known Problems Paternal Aunt     No Known Problems Paternal Uncle     No Known Problems Maternal Grandmother     No Known Problems Maternal Grandfather     No Known Problems Paternal Grandmother     No Known Problems Paternal Grandfather     No Known Problems Other      Current Outpatient Prescriptions   Medication Sig Dispense Refill    atorvastatin (LIPITOR) 10 mg tablet Take 1 Tab by mouth nightly. Indications: prevention of cerebrovascular accident 80 Tab 1    candesartan (ATACAND) 32 mg tablet Take 1 Tab by mouth daily. 90 Tab 1    glimepiride (AMARYL) 2 mg tablet Take 1 Tab by mouth every morning. 90 Tab 1    aspirin 81 mg chewable tablet Take 1 Tab by mouth daily. 90 Tab 1    acetaminophen (TYLENOL) 325 mg tablet Take 650 mg by mouth every six (6) hours as needed for Pain.  meloxicam (MOBIC) 7.5 mg tablet Take 1 Tab by mouth daily.  14 Tab 0    diclofenac (VOLTAREN) 1 % gel Apply 2 g to affected area four (4) times daily. 2 Each 1    OTHER Tub seat 1 Units 0    OTHER Rolling walker with seat 1 Units 0     No Known Allergies    Review of Systems  A comprehensive review of systems was negative except for that written in the HPI. Physical Exam     Visit Vitals    /73 (BP 1 Location: Left arm, BP Patient Position: Sitting)    Pulse 73    Temp 98.4 °F (36.9 °C) (Oral)    Resp 18    Ht 5' 3\" (1.6 m)    Wt 152 lb (68.9 kg)    SpO2 97%    BMI 26.93 kg/m2       GEN: Well appearing. No apparent distress. Responds to all questions appropriately. Lungs: No labored respirations. Talking in complete sentences without difficulty. Shoulder: right  Deformity: None    ROM:  Forward Flexion: Active: 170     ER (0): Active: 45     IR (0): Active: Behind the body to the level L4  Abduction: Active: 170      Palpation:  AC tenderness: None  SC tenderness: None  Clavicle tenderness: None  Biceps tenderness: None    Strength (0-5/5):  Deltoid  Anterior: 5/5  Deltoid  Posterior: 5/5  Deltoid  Mid: 5/5  Supraspinatus: 5/5  External rotation: 5/5  Internal rotation: 5/5    Neuro/Vascular:  Pulses intact, no edema, and neurologically intact    C-Spine:  Cervical motion: FROM without pain. Cervical tenderness: None  Spurlings test: Negative    Skin: No obvious rash or skin breakdown. ImaginV of R shoulder personally reviewed shows no fracture, dislocation. [address] degenerative changes present. Assessment:    ICD-10-CM ICD-9-CM    1. Injury of right shoulder, initial encounter S49. 91XA 959.2 XR SHOULDER RT AP/LAT MIN 2 V     - likely shoulder contusion with no acute rotator cuff injury. Plan:  1. Home Exercise Program as per handout. 2. Ice 15 minutes, three times a day PRN and after exercise. Can alternate with heat for 15 minutes. Form completed for DMV Handicap pass due to difficulty walking distances. Medications:    1. Naproxin (Aleve): 220mg 1-2 tablets twice a day PRN. 2. Acetaminophen (Tylenol):  500mg 1-2 tablets every 6 hours as needed for pain. RTC: 4 weeks if no improvement. Sooner if sx change or worsen.

## 2018-06-11 NOTE — PROGRESS NOTES
Chief Complaint   Patient presents with    Shoulder Pain     s/p fall x 1 week ago     1. Have you been to the ER, urgent care clinic since your last visit? Hospitalized since your last visit? No    2. Have you seen or consulted any other health care providers outside of the [address] [place] [address] since your last visit? Include any pap smears or colon screening.  No

## 2018-06-11 NOTE — MR AVS SNAPSHOT
[address] 
[phone] Patient: Sergei Risk MRN: [id number] :11/3/1926 Visit Information Yee Norton Personal Médico Departamento Teléfono del Dep. Número de visita 2018 11:00 AM Sancho Sanches, [address] [place], [place] [id number] [id number] Follow-up Instructions Return if symptoms worsen or fail to improve. Upcoming Health Maintenance Date Due  
 FOOT EXAM Q1 11/3/1936 MICROALBUMIN Q1 11/3/1936 EYE EXAM RETINAL OR DILATED Q1 11/3/1936 DTaP/Tdap/Td series (1 - Tdap) 11/3/1947 ZOSTER VACCINE AGE 60> 9/3/1986 GLAUCOMA SCREENING Q2Y 11/3/1991 Bone Densitometry (Dexa) Screening 11/3/1991 Pneumococcal 65+ Low/Medium Risk (1 of 2 - PCV13) 11/3/1991 HEMOGLOBIN A1C Q6M 2018 Influenza Age 5 to Adult 2018 LIPID PANEL Q1 2018 Alergias  Review Complete El: 4/3/2018 Por: Anita Dean RN  
 A partir del:  2018 No Known Allergies Vacunas actuales Joyner Pel No hay ninguna vacuna archivada. No revisadas esta visita You Were Diagnosed With   
  
 Swati Yvette Injury of right shoulder, initial encounter    -  Primary ICD-10-CM: J83.33QH ICD-9-CM: 959.2 Partes vitales PS Pulso Temperatura Resp Advance ( percentil de crecimiento) Peso (percentil de crecimiento) 162/73 (BP 1 Location: Left arm, BP Patient Position: Sitting) 73 98.4 °F (36.9 °C) (Oral) 18 5' 3\" (1.6 m) 152 lb (68.9 kg) SpO2 BMI (IMC) Estado obstétrico Estatus de tabaquísmo 97% 26.93 kg/m2 Postmenopausal Never Smoker Historial de signos vitales BMI and BSA Data Body Mass Index Body Surface Area  
 26.93 kg/m 2 1.75 m 2 [facility] Name Phone [address] KEELY LEMUS AT [address] [place] [address]. [phone] Marie lista de medicamentos actualizada Lista actualizada 6/11/18  5:00 PM.  Nya Pies use marie lista de medicamentos más reciente. acetaminophen 325 mg tablet También conocido jaswinder:  TYLENOL Take 650 mg by mouth every six (6) hours as needed for Pain. aspirin 81 mg chewable tablet Take 1 Tab by mouth daily. atorvastatin 10 mg tablet También conocido jaswinder:  LIPITOR Take 1 Tab by mouth nightly. Indications: prevention of cerebrovascular accident  
  
 candesartan 32 mg tablet También conocido jaswinder:  ATACAND Take 1 Tab by mouth daily. diclofenac 1 % Gel También conocido jaswinder:  VOLTAREN Apply 2 g to affected area four (4) times daily. glimepiride 2 mg tablet También conocido jaswinder:  AMARYL Take 1 Tab by mouth every morning. meloxicam 7.5 mg tablet También conocido jaswinder:  MOBIC Take 1 Tab by mouth daily. * OTHER Tub seat * OTHER Rolling walker with seat * Aviso:  Esta lista contiene medicamentos que son iguales a otros medicamentos recetados para usted. Celestina las instrucciones con cuidado y pida a marie personal médico que revise la lista de medicamentos y las instrucciones correspondientes con usted. Instrucciones de seguimiento Return if symptoms worsen or fail to improve. Por hacer 06/11/2018 Imaging:  XR SHOULDER RT AP/LAT MIN 2 V Introducing [facility]! [facility] introduce portal paciente Hank . Ahora se puede acceder a partes de marie expediente médico, enviar por correo electrónico la oficina de marie médico y solicitar renovaciones de medicamentos en línea. En marie navegador de Internet , Riaz Castrejon a [email or web address] George clic en el usuario por Ni Lee? Mahsa Albion clic aquí en la sesión Jamee Luna. Verá la página de registro [place]. Ingrese marie código de [facility] anais y jaswinder aparece a continuación. Usted no tendrá que [employer] código después de elver completado el proceso de registro . Si usted no se inscribe antes de la fecha de caducidad , debe solicitar un nuevo código. · MyChart Código de acceso : [id number] Expires: 7/2/2018  5:56 PM 
 
Ingresa los últimos cuatro dígitos de deshpande Número de Seguro Social ( xxxx ) y fecha de nacimiento ( dd / mm / aaaa ) jaswinder se indica y george clic en Enviar. Usted será llevado a la siguiente página de registro . Crear un ID MyChart . Esta será deshpande ID de inicio de sesión de MyChart y no puede ser [place] , por lo que pensar en yvonne que es Debroah Teresa y fácil de recordar . Crear yvonne contraseña MyChart . Usted puede cambiar deshpande contraseña en cualquier momento . Ingrese deshpande Password Reset de preguntas y [place] . Mingus se puede utilizar en un momento posterior si usted olvida deshpande contraseña. Introduzca deshpande dirección de correo electrónico . Nita Bauman recibirá yvonne notificación por correo electrónico cuando la nueva información está disponible en MyChart . Theo herric en Registrarse. Olam Bustle ash y descargar porciones de deshpande expediente médico. 
George clic en el enlace de descarga del menú Resumen para descargar yvonne copia portátil de deshpande información médica . Si tiene [employer] , por favor visite la sección de preguntas frecuentes del sitio web MyChart . Recuerde, MyChart NO es que se utilizará para las necesidades urgentes. Para emergencias médicas , llame al 911 . Ahora disponible en deshpande iPhone y Android ! Por favor proporcione jonn resumen de la documentación de cuidado a deshpande próximo proveedor. Your primary care clinician is listed as Merlin Reyna. If you have any questions after today's visit, please call [phone].

## 2020-01-12 ENCOUNTER — HOSPITAL ENCOUNTER (EMERGENCY)
Age: 85
Discharge: HOME | End: 2020-01-12
Attending: EMERGENCY MEDICINE
Payer: SELF-PAY

## 2020-01-12 ENCOUNTER — APPOINTMENT (OUTPATIENT)
Dept: CT IMAGING | Age: 85
End: 2020-01-12
Attending: NURSE PRACTITIONER
Payer: SELF-PAY

## 2020-01-12 VITALS
DIASTOLIC BLOOD PRESSURE: 85 MMHG | OXYGEN SATURATION: 98 % | BODY MASS INDEX: 28.54 KG/M2 | HEART RATE: 72 BPM | TEMPERATURE: 98.1 F | SYSTOLIC BLOOD PRESSURE: 120 MMHG | HEIGHT: 57 IN | RESPIRATION RATE: 17 BRPM | WEIGHT: 132.28 LBS

## 2020-01-12 DIAGNOSIS — R21 RASH: ICD-10-CM

## 2020-01-12 DIAGNOSIS — R10.84 ABDOMINAL PAIN, GENERALIZED: Primary | ICD-10-CM

## 2020-01-12 LAB
ALBUMIN SERPL-MCNC: 3.3 G/DL (ref 3.5–5)
ALBUMIN/GLOB SERPL: 0.9 {RATIO} (ref 1.1–2.2)
ALP SERPL-CCNC: 84 U/L (ref 45–117)
ALT SERPL-CCNC: 23 U/L (ref 12–78)
ANION GAP SERPL CALC-SCNC: 3 MMOL/L (ref 5–15)
APPEARANCE UR: CLEAR
AST SERPL-CCNC: 18 U/L (ref 15–37)
BACTERIA URNS QL MICRO: NEGATIVE /HPF
BASOPHILS # BLD: 0.1 K/UL (ref 0–0.1)
BASOPHILS NFR BLD: 1 % (ref 0–1)
BILIRUB SERPL-MCNC: 0.3 MG/DL (ref 0.2–1)
BILIRUB UR QL: NEGATIVE
BUN SERPL-MCNC: 14 MG/DL (ref 6–20)
BUN/CREAT SERPL: 18 (ref 12–20)
CALCIUM SERPL-MCNC: 8 MG/DL (ref 8.5–10.1)
CHLORIDE SERPL-SCNC: 107 MMOL/L (ref 97–108)
CO2 SERPL-SCNC: 30 MMOL/L (ref 21–32)
COLOR UR: NORMAL
COMMENT, HOLDF: NORMAL
CREAT SERPL-MCNC: 0.78 MG/DL (ref 0.55–1.02)
DIFFERENTIAL METHOD BLD: NORMAL
EOSINOPHIL # BLD: 0.1 K/UL (ref 0–0.4)
EOSINOPHIL NFR BLD: 2 % (ref 0–7)
EPITH CASTS URNS QL MICRO: NORMAL /LPF
ERYTHROCYTE [DISTWIDTH] IN BLOOD BY AUTOMATED COUNT: 12.8 % (ref 11.5–14.5)
GLOBULIN SER CALC-MCNC: 3.8 G/DL (ref 2–4)
GLUCOSE SERPL-MCNC: 215 MG/DL (ref 65–100)
GLUCOSE UR STRIP.AUTO-MCNC: NEGATIVE MG/DL
HCT VFR BLD AUTO: 39.9 % (ref 35–47)
HGB BLD-MCNC: 13 G/DL (ref 11.5–16)
HGB UR QL STRIP: NEGATIVE
HYALINE CASTS URNS QL MICRO: NORMAL /LPF (ref 0–5)
IMM GRANULOCYTES # BLD AUTO: 0 K/UL (ref 0–0.04)
IMM GRANULOCYTES NFR BLD AUTO: 0 % (ref 0–0.5)
KETONES UR QL STRIP.AUTO: NEGATIVE MG/DL
LACTATE SERPL-SCNC: 1.2 MMOL/L (ref 0.4–2)
LEUKOCYTE ESTERASE UR QL STRIP.AUTO: NEGATIVE
LIPASE SERPL-CCNC: 120 U/L (ref 73–393)
LYMPHOCYTES # BLD: 1.7 K/UL (ref 0.8–3.5)
LYMPHOCYTES NFR BLD: 24 % (ref 12–49)
MAGNESIUM SERPL-MCNC: 2.1 MG/DL (ref 1.6–2.4)
MCH RBC QN AUTO: 29.1 PG (ref 26–34)
MCHC RBC AUTO-ENTMCNC: 32.6 G/DL (ref 30–36.5)
MCV RBC AUTO: 89.3 FL (ref 80–99)
MONOCYTES # BLD: 0.7 K/UL (ref 0–1)
MONOCYTES NFR BLD: 10 % (ref 5–13)
NEUTS SEG # BLD: 4.5 K/UL (ref 1.8–8)
NEUTS SEG NFR BLD: 63 % (ref 32–75)
NITRITE UR QL STRIP.AUTO: NEGATIVE
NRBC # BLD: 0 K/UL (ref 0–0.01)
NRBC BLD-RTO: 0 PER 100 WBC
PH UR STRIP: 7.5 [PH] (ref 5–8)
PLATELET # BLD AUTO: 265 K/UL (ref 150–400)
PMV BLD AUTO: 11.2 FL (ref 8.9–12.9)
POTASSIUM SERPL-SCNC: 3.7 MMOL/L (ref 3.5–5.1)
PROT SERPL-MCNC: 7.1 G/DL (ref 6.4–8.2)
PROT UR STRIP-MCNC: NEGATIVE MG/DL
RBC # BLD AUTO: 4.47 M/UL (ref 3.8–5.2)
RBC #/AREA URNS HPF: NORMAL /HPF (ref 0–5)
SAMPLES BEING HELD,HOLD: NORMAL
SODIUM SERPL-SCNC: 140 MMOL/L (ref 136–145)
SP GR UR REFRACTOMETRY: 1.01 (ref 1–1.03)
TROPONIN I SERPL-MCNC: <0.05 NG/ML
UR CULT HOLD, URHOLD: NORMAL
UROBILINOGEN UR QL STRIP.AUTO: 1 EU/DL (ref 0.2–1)
WBC # BLD AUTO: 7.2 K/UL (ref 3.6–11)
WBC URNS QL MICRO: NORMAL /HPF (ref 0–4)

## 2020-01-12 PROCEDURE — 80053 COMPREHEN METABOLIC PANEL: CPT

## 2020-01-12 PROCEDURE — 83690 ASSAY OF LIPASE: CPT

## 2020-01-12 PROCEDURE — 93005 ELECTROCARDIOGRAM TRACING: CPT

## 2020-01-12 PROCEDURE — 83605 ASSAY OF LACTIC ACID: CPT

## 2020-01-12 PROCEDURE — 83735 ASSAY OF MAGNESIUM: CPT

## 2020-01-12 PROCEDURE — 85025 COMPLETE CBC W/AUTO DIFF WBC: CPT

## 2020-01-12 PROCEDURE — 84484 ASSAY OF TROPONIN QUANT: CPT

## 2020-01-12 PROCEDURE — 99283 EMERGENCY DEPT VISIT LOW MDM: CPT

## 2020-01-12 PROCEDURE — 81001 URINALYSIS AUTO W/SCOPE: CPT

## 2020-01-12 PROCEDURE — 74177 CT ABD & PELVIS W/CONTRAST: CPT

## 2020-01-12 PROCEDURE — 74011636320 HC RX REV CODE- 636/320: Performed by: RADIOLOGY

## 2020-01-12 PROCEDURE — 36415 COLL VENOUS BLD VENIPUNCTURE: CPT

## 2020-01-12 RX ADMIN — IOPAMIDOL 100 ML: 755 INJECTION, SOLUTION INTRAVENOUS at 16:52

## 2020-01-12 NOTE — ED NOTES
3:34 PM  I have evaluated the patient as the Provider in Triage. I have reviewed Her vital signs and the triage nurse assessment. I have talked with the patient and any available family and advised that I am the provider in triage and have ordered the appropriate study to initiate their work up based on the clinical presentation during my assessment. I have advised that the patient will be accommodated in the Main ED as soon as possible. I have also requested to contact the triage nurse or myself immediately if the patient experiences any changes in their condition during this brief waiting period. C/o worsening abdominal and epigastric pain. No nausea, no vomiting. No fever or chills. Recent travel from [place]. No shortness of breath.   Gagandeep Porras, MATHEUS

## 2020-01-12 NOTE — ED PROVIDER NOTES
The history is provided by the patient and a relative. The history is limited by a language barrier. A  was used. Abdominal Pain    This is a new problem. The current episode started 6 to 12 hours ago. The problem occurs rarely. The problem has not changed since onset. The pain is associated with previous surgery. The pain is located in the generalized abdominal region. The quality of the pain is dull. The pain is at a severity of 6/10. The pain is moderate. Pertinent negatives include no anorexia, no fever, no belching, no diarrhea, no flatus, no hematochezia, no melena, no nausea, no vomiting, no constipation, no dysuria, no frequency, no hematuria, no headaches, no arthralgias, no myalgias, no trauma, no chest pain and no back pain. Nothing worsens the pain. The pain is relieved by nothing. Her past medical history is significant for DM.         Past Medical History:   Diagnosis Date    Acute CVA (cerebrovascular accident) ([address] 8/2/2017    L occipital lobe, Mid and posterior, L temporal lobe, and L thalamus    Diabetes ([address]     Hypertension        Past Surgical History:   Procedure Laterality Date    HX OTHER SURGICAL      tonsils         Family History:   Problem Relation Age of Onset    No Known Problems Mother     No Known Problems Father     No Known Problems Sister     No Known Problems Brother     No Known Problems Maternal Aunt     No Known Problems Maternal Uncle     No Known Problems Paternal Aunt     No Known Problems Paternal Uncle     No Known Problems Maternal Grandmother     No Known Problems Maternal Grandfather     No Known Problems Paternal Grandmother     No Known Problems Paternal Grandfather     No Known Problems Other        Social History     Socioeconomic History    Marital status: SINGLE     Spouse name: Not on file    Number of children: Not on file    Years of education: Not on file    Highest education level: Not on file   Occupational History  Not on file   Social Needs    Financial resource strain: Not on file    Food insecurity:     Worry: Not on file     Inability: Not on file    Transportation needs:     Medical: Not on file     Non-medical: Not on file   Tobacco Use    Smoking status: Never Smoker    Smokeless tobacco: Never Used   Substance and Sexual Activity    Alcohol use: No    Drug use: No    Sexual activity: Never   Lifestyle    Physical activity:     Days per week: Not on file     Minutes per session: Not on file    Stress: Not on file   Relationships    Social connections:     Talks on phone: Not on file     Gets together: Not on file     Attends Protestant service: Not on file     Active member of club or organization: Not on file     Attends meetings of clubs or organizations: Not on file     Relationship status: Not on file    Intimate partner violence:     Fear of current or ex partner: Not on file     Emotionally abused: Not on file     Physically abused: Not on file     Forced sexual activity: Not on file   Other Topics Concern    Not on file   Social History Narrative    Not on file         ALLERGIES: Patient has no known allergies. Review of Systems   Constitutional: Negative for activity change, chills and fever. HENT: Negative for nosebleeds, sore throat, trouble swallowing and voice change. Eyes: Negative for visual disturbance. Respiratory: Negative for shortness of breath. Cardiovascular: Negative for chest pain and palpitations. Gastrointestinal: Positive for abdominal pain. Negative for anorexia, constipation, diarrhea, flatus, hematochezia, melena, nausea and vomiting. Genitourinary: Negative for difficulty urinating, dysuria, frequency, hematuria and urgency. Musculoskeletal: Negative for arthralgias, back pain, myalgias, neck pain and neck stiffness. Skin: Positive for rash. Negative for color change. Allergic/Immunologic: Negative for immunocompromised state.    Neurological: Negative for dizziness, seizures, syncope, weakness, light-headedness, numbness and headaches. Psychiatric/Behavioral: Negative for behavioral problems, confusion, hallucinations, self-injury and suicidal ideas. Vitals:    01/12/20 1534 01/12/20 1749 01/12/20 1751   BP: 177/75 105/83    Pulse: 69     Resp: 19     Temp: 98.1 °F (36.7 °C)     SpO2: 96% (!) 77% 98%   Weight: 60 kg (132 lb 4.4 oz)     Height: 4' 9\" (1.448 m)              Physical Exam  Vitals signs and nursing note reviewed. Constitutional:       General: She is not in acute distress. Appearance: She is well-developed. She is not diaphoretic. HENT:      Head: Normocephalic and atraumatic. Eyes:      Pupils: Pupils are equal, round, and reactive to light. Neck:      Musculoskeletal: Normal range of motion and neck supple. Cardiovascular:      Rate and Rhythm: Normal rate and regular rhythm. Heart sounds: Normal heart sounds. No murmur. No friction rub. No gallop. Pulmonary:      Effort: Pulmonary effort is normal. No respiratory distress. Breath sounds: Normal breath sounds. No wheezing. Abdominal:      General: Bowel sounds are normal. There is no distension. Palpations: Abdomen is soft. Tenderness: There is generalized tenderness. There is no guarding or rebound. Musculoskeletal: Normal range of motion. Skin:     General: Skin is warm. Findings: No rash. Neurological:      Mental Status: She is alert and oriented to person, place, and time. Psychiatric:         Behavior: Behavior normal.         Thought Content: Thought content normal.         Judgment: Judgment normal.          MDM     This is a 20-year-old female with past medical history, review of systems, physical exam as above, presenting with complaints of abdominal pain.   Patient states through her grandson who is translating, patient began experiencing diffuse abdominal pain today, described as crampy in nature, improving with Tylenol, without complaints of dysuria, nausea, vomiting, states normal bowel movement this morning. Patient endorses a history of previous abdominal surgery, large scar noted on abdominal exam, with diffuse abdominal tenderness, without rebound or guarding. Patient noted to be afebrile, without tachycardia or hypotension. She also complains of erythematous. Rash, on the left thigh. Lab work including CMP, CBC, and CT scan of the abdomen obtained in triage is unremarkable. Plan to add lipase, lactic acid, magnesium, reevaluate, and make a disposition. Procedures    Update:  Unremarkable lab work and urine, reassuring vital signs, patient remains in no acute distress. Will prescribe short course of steroid cream for rash, advised primary care follow-up. Return precautions given.

## 2020-01-12 NOTE — ED TRIAGE NOTES
Patient arrives to the ER with some LUQ abdominal pain and pain on the whole left side of the body. Patient also has a rash on her upper left leg and stomach.  HX of DM

## 2020-01-12 NOTE — DISCHARGE INSTRUCTIONS
Salpullido: Instrucciones de cuidado - [ Rash: Care Instructions ]  Instrucciones de cuidado  Un salpullido es yvonne irritación o inflamación de la piel. Los salpullidos tienen muchas causas posibles, jaswinder [place], infecciones, [place], calor y estrés emocional.  La atención de seguimiento es yvonne parte clave de deshpande tratamiento y seguridad. Asegúrese de hacer y acudir a todas las citas, y llame a deshpande médico si está teniendo problemas. También es yvonne buena idea saber los resultados de bill exámenes y mantener yvonne lista de los medicamentos que maryam. ¿Cómo puede cuidarse en el hogar? · Lávese la clara sólo con agua. El jabón puede empeorar la sequedad y la comezón. Seque la clara con toques suaves de toalla. · Colóquese paños fríos húmedos en el salpullido para reducir la comezón. · Manténgase fresco y evite el sol. · Deje el salpullido en contacto con el aire tanto jaswinder sea posible. · En ocasiones la vaselina puede ayudar a [employer] causadas por un salpullido. También podría ayudar yvonne loción humectante, jaswinder, por ejemplo, Cetaphil. Yvonne loción de calamina puede ayudar si el salpullido es causado por el contacto con algo (jaswinder yvonne planta o jabón) que haya irritado la piel. Úsela 3 ó 4 veces al día. · Si deshpande médico le recetó yvonne crema, úsela según las indicaciones. Si deshpande médico le recetó medicamentos, tómelos exactamente según las indicaciones. · Si el salpullido le pica tanto que interfiere con bill actividades normales, tome un antihistamínico de venta [place], jaswinder difenhidramina (Benadryl) o loratadina (Claritin). Celestina y siga todas las instrucciones de la [place]. ¿Cuándo debe pedir ayuda? Llame a deshpande médico ahora mismo o busque atención médica inmediata si:    · Tiene señales de infección, tales jaswinder:  ? Aumento del dolor, la hinchazón, el enrojecimiento o la temperatura. ? Vetas rojizas que salen de la clara. ? Pus que supura de la clara.   ? [place] Kristen.     · Tiene dolor en las articulaciones junto con el salpullido.    Preste especial atención a los cambios en deshpande bella y asegúrese de comunicarse con deshpande médico si:    · El salpullido cambia o [place]. Por ejemplo, llame si tiene dolor junto con el salpullido, el salpullido se extiende o tiene ampollas nuevas.     · No mejora después de 1 semana. ¿Dónde puede encontrar más información en inglés? [place] a [email or web address] [place] [address] en la búsqueda para aprender más acerca de \"Salpullido: Instrucciones de cuidado - [ Rash: Care Instructions ]. \"  Revisado: 1 abril, 2019  Versión del contenido: 12.2  © 3991-3334 Healthwise, Incorporated. Las instrucciones de cuidado fueron adaptadas bajo licencia por [facility] Connections (which disclaims liability or warranty for this information). Si usted tiene [place] afección médica o sobre estas instrucciones, siempre pregunte a deshpande profesional de bella. Healthwise, Incorporated niega toda garantía o responsabilidad por deshpande uso de esta información. Patient Education        Dolor abdominal: Instrucciones de cuidado - [ Abdominal Pain: Care Instructions ]  Instrucciones de cuidado    El dolor abdominal tiene muchas causas posibles. Algunas de ellas no son graves y mejoran por sí solas en unos días. Otras requieren [place] y [place]. Si deshpande dolor continúa o [place], necesitará yvonne nueva revisión y [place] pruebas para determinar qué pasa. Es posible que necesite cirugía para corregir el problema. No ignore nuevos síntomas, jaswinder fiebre, náuseas y [place], [facility], dolor que [place] o [place]. Podrían ser señales de un problema más grave. Deshpande médico puede haberle recomendado yvonne consulta de [employer]velin las 8 o 12 horas siguientes. Si no se siente mejor, es posible que requiera [place] o [place]. El médico lo alvarez revisado minuciosamente, mimi puede elver problemas más tarde.  Si nota algún problema o síntomas nuevos, busque tratamiento médico inmediatamente. La atención de seguimiento es yvonne parte clave de deshpande tratamiento y seguridad. Asegúrese de hacer y acudir a todas las citas, y llame a deshpande médico si está teniendo problemas. También es yvonne buena idea saber los resultados de bill exámenes y mantener yvonne lista de los medicamentos que maryam. ¿Cómo puede cuidarse en el hogar? · Descanse hasta que se sienta mejor. · Para prevenir la deshidratación, rosemary abundantes líquidos, suficientes para que deshpande orina sea de color amarillo petrona o transparente jaswinder el agua. Elija beber agua y otros líquidos lorraine sin cafeína hasta que se sienta mejor. Si tiene [place] & [facility] [employer], del corazón o del hígado y tiene que [place]'s líquidos, hable con deshpande médico antes de aumentar deshpande consumo. · Si tiene [employer], coma alimentos suaves, jaswinder arroz, pan deirdre seco o galletas saladas, bananas (plátanos) y puré de [place]. Trate de comer varias comidas pequeñas al día en lugar de dos o earl grandes. · Espere hasta 48 horas después de que todos los síntomas hayan desaparecido antes de comer alimentos condimentados, alcohol y bebidas que contengan cafeína. · No consuma alimentos ricos en grasa. · Evite medicamentos antiinflamatorios jaswinder aspirina, ibuprofeno (Advil, Motrin) y naproxeno (Aleve). Pueden causar [employer]. Dígale a deshpande médico si está tomando aspirina diariamente debido a otro problema de bella. ¿Cuándo debe pedir ayuda? Llame al 911 en cualquier momento que considere que necesita atención de emergencia. Por ejemplo, llame si:    · Se desmayó (perdió el conocimiento).   · Las heces son de color rojizo o muy sanguinolentas (con rekha).      · Vomita rekha o algo parecido a granos de café molido.     · Tiene dolor abdominal nuevo e intenso.    Llame a deshpande médico ahora mismo o busque atención médica inmediata si:    · Deshpande dolor empeora, sobre todo si se concentra en yvonne micah parte del vientre.     · Vuelve a tener fiebre o tiene fiebre más larry.     · Omayra heces son negruzcas y parecidas al alquitrán o tienen rastros de [place].     · Tiene sangrado vaginal inesperado.     · Tiene síntomas de yvonne infección del tracto urinario. Estos podrían incluir:  ? Dolor al Donnel Crow. ? Orinar con más frecuencia que lo habitual.  ? [place] en la [facility].     · Siente mareos o aturdimiento, o que está a punto de desmayarse.    Preste especial atención a los cambios en deshpande bella y asegúrese de comunicarse con deshpande médico si:    · No está mejorando después de 1 día (24 horas). ¿Dónde puede encontrar más información en inglés? Linden Reina a [email or web address]-desmond.[email or web address] Titus Avina N345 en la búsqueda para aprender más acerca de \"Dolor abdominal: Instrucciones de cuidado - [ Abdominal Pain: Care Instructions ]. \"  Revisado: 26 junio, 2019  Versión del contenido: 12.2  © 6236-4079 [employer]. Las instrucciones de cuidado fueron adaptadas bajo licencia por [employer] Connections (which disclaims liability or warranty for this information). Si usted tiene [place] afección médica o sobre estas instrucciones, siempre pregunte a deshpande profesional de bella. [employer] niega toda garantía o responsabilidad por deshpande uso de esta información.

## 2020-01-13 LAB
ATRIAL RATE: 69 BPM
CALCULATED P AXIS, ECG09: 52 DEGREES
CALCULATED R AXIS, ECG10: 45 DEGREES
CALCULATED T AXIS, ECG11: 48 DEGREES
DIAGNOSIS, 93000: NORMAL
P-R INTERVAL, ECG05: 176 MS
Q-T INTERVAL, ECG07: 412 MS
QRS DURATION, ECG06: 92 MS
QTC CALCULATION (BEZET), ECG08: 441 MS
VENTRICULAR RATE, ECG03: 69 BPM